# Patient Record
Sex: MALE | Race: WHITE | NOT HISPANIC OR LATINO | Employment: FULL TIME | URBAN - METROPOLITAN AREA
[De-identification: names, ages, dates, MRNs, and addresses within clinical notes are randomized per-mention and may not be internally consistent; named-entity substitution may affect disease eponyms.]

---

## 2021-04-08 DIAGNOSIS — Z23 ENCOUNTER FOR IMMUNIZATION: ICD-10-CM

## 2023-01-01 ENCOUNTER — ANESTHESIA EVENT (OUTPATIENT)
Dept: GASTROENTEROLOGY | Facility: HOSPITAL | Age: 63
End: 2023-01-01

## 2023-01-01 ENCOUNTER — ANESTHESIA (OUTPATIENT)
Dept: GASTROENTEROLOGY | Facility: HOSPITAL | Age: 63
End: 2023-01-01

## 2023-01-01 ENCOUNTER — APPOINTMENT (INPATIENT)
Dept: RADIOLOGY | Facility: HOSPITAL | Age: 63
DRG: 871 | End: 2023-01-01
Payer: COMMERCIAL

## 2023-01-01 ENCOUNTER — APPOINTMENT (EMERGENCY)
Dept: CT IMAGING | Facility: HOSPITAL | Age: 63
DRG: 871 | End: 2023-01-01
Payer: COMMERCIAL

## 2023-01-01 ENCOUNTER — APPOINTMENT (EMERGENCY)
Dept: RADIOLOGY | Facility: HOSPITAL | Age: 63
DRG: 871 | End: 2023-01-01
Payer: COMMERCIAL

## 2023-01-01 ENCOUNTER — HOSPITAL ENCOUNTER (INPATIENT)
Facility: HOSPITAL | Age: 63
LOS: 6 days | DRG: 871 | End: 2023-10-24
Attending: EMERGENCY MEDICINE | Admitting: GENERAL PRACTICE
Payer: COMMERCIAL

## 2023-01-01 ENCOUNTER — LAB REQUISITION (OUTPATIENT)
Dept: LAB | Facility: HOSPITAL | Age: 63
End: 2023-01-01
Payer: COMMERCIAL

## 2023-01-01 VITALS
TEMPERATURE: 97.4 F | DIASTOLIC BLOOD PRESSURE: 64 MMHG | OXYGEN SATURATION: 93 % | HEIGHT: 72 IN | HEART RATE: 99 BPM | BODY MASS INDEX: 24.82 KG/M2 | SYSTOLIC BLOOD PRESSURE: 113 MMHG | RESPIRATION RATE: 18 BRPM

## 2023-01-01 DIAGNOSIS — D72.829 LEUKOCYTOSIS: ICD-10-CM

## 2023-01-01 DIAGNOSIS — Z79.899 OTHER LONG TERM (CURRENT) DRUG THERAPY: ICD-10-CM

## 2023-01-01 DIAGNOSIS — K63.5 POLYP OF COLON: ICD-10-CM

## 2023-01-01 DIAGNOSIS — E87.1 HYPONATREMIA: ICD-10-CM

## 2023-01-01 DIAGNOSIS — C78.7 SECONDARY MALIGNANT NEOPLASM OF LIVER AND INTRAHEPATIC BILE DUCT (HCC): ICD-10-CM

## 2023-01-01 DIAGNOSIS — G89.3 NEOPLASM RELATED PAIN (ACUTE) (CHRONIC): ICD-10-CM

## 2023-01-01 DIAGNOSIS — E83.52 HYPERCALCEMIA: ICD-10-CM

## 2023-01-01 DIAGNOSIS — E86.0 DEHYDRATION: ICD-10-CM

## 2023-01-01 DIAGNOSIS — N17.9 AKI (ACUTE KIDNEY INJURY) (HCC): ICD-10-CM

## 2023-01-01 DIAGNOSIS — R17 UNSPECIFIED JAUNDICE: ICD-10-CM

## 2023-01-01 DIAGNOSIS — R97.8 OTHER ABNORMAL TUMOR MARKERS: ICD-10-CM

## 2023-01-01 DIAGNOSIS — C25.9 PANCREATIC CANCER (HCC): ICD-10-CM

## 2023-01-01 DIAGNOSIS — E80.6 DIRECT HYPERBILIRUBINEMIA: ICD-10-CM

## 2023-01-01 DIAGNOSIS — K85.90 ACUTE PANCREATITIS, UNSPECIFIED COMPLICATION STATUS, UNSPECIFIED PANCREATITIS TYPE: Primary | ICD-10-CM

## 2023-01-01 LAB
2HR DELTA HS TROPONIN: -1 NG/L
4HR DELTA HS TROPONIN: -2 NG/L
ALBUMIN SERPL BCP-MCNC: 2.4 G/DL (ref 3.5–5)
ALBUMIN SERPL BCP-MCNC: 2.5 G/DL (ref 3.5–5)
ALBUMIN SERPL BCP-MCNC: 2.8 G/DL (ref 3.5–5)
ALP SERPL-CCNC: 772 U/L (ref 34–104)
ALP SERPL-CCNC: 806 U/L (ref 34–104)
ALP SERPL-CCNC: 907 U/L (ref 34–104)
ALT SERPL W P-5'-P-CCNC: 116 U/L (ref 7–52)
ALT SERPL W P-5'-P-CCNC: 90 U/L (ref 7–52)
ALT SERPL W P-5'-P-CCNC: 97 U/L (ref 7–52)
AMORPH URATE CRY URNS QL MICRO: NORMAL
ANION GAP SERPL CALCULATED.3IONS-SCNC: 10 MMOL/L
ANION GAP SERPL CALCULATED.3IONS-SCNC: 12 MMOL/L
ANION GAP SERPL CALCULATED.3IONS-SCNC: 12 MMOL/L
ANISOCYTOSIS BLD QL SMEAR: PRESENT
APTT PPP: 30 SECONDS (ref 23–37)
AST SERPL W P-5'-P-CCNC: 161 U/L (ref 13–39)
AST SERPL W P-5'-P-CCNC: 167 U/L (ref 13–39)
AST SERPL W P-5'-P-CCNC: 268 U/L (ref 13–39)
ATRIAL RATE: 84 BPM
BACTERIA BLD CULT: NORMAL
BACTERIA BLD CULT: NORMAL
BACTERIA UR QL AUTO: NORMAL /HPF
BASOPHILS # BLD AUTO: 0.09 THOUSANDS/ÂΜL (ref 0–0.1)
BASOPHILS # BLD MANUAL: 0 THOUSAND/UL (ref 0–0.1)
BASOPHILS NFR BLD AUTO: 1 % (ref 0–1)
BASOPHILS NFR MAR MANUAL: 0 % (ref 0–1)
BILIRUB DIRECT SERPL-MCNC: 22.15 MG/DL (ref 0–0.2)
BILIRUB SERPL-MCNC: 31.51 MG/DL (ref 0.2–1)
BILIRUB SERPL-MCNC: 32.05 MG/DL (ref 0.2–1)
BILIRUB SERPL-MCNC: 35.56 MG/DL (ref 0.2–1)
BILIRUB UR QL STRIP: ABNORMAL
BUN SERPL-MCNC: 35 MG/DL (ref 5–25)
BUN SERPL-MCNC: 42 MG/DL (ref 5–25)
BUN SERPL-MCNC: 58 MG/DL (ref 5–25)
CALCIUM ALBUM COR SERPL-MCNC: 10.7 MG/DL (ref 8.3–10.1)
CALCIUM ALBUM COR SERPL-MCNC: 12 MG/DL (ref 8.3–10.1)
CALCIUM ALBUM COR SERPL-MCNC: 9.2 MG/DL (ref 8.3–10.1)
CALCIUM SERPL-MCNC: 11 MG/DL (ref 8.4–10.2)
CALCIUM SERPL-MCNC: 7.9 MG/DL (ref 8.4–10.2)
CALCIUM SERPL-MCNC: 9.5 MG/DL (ref 8.4–10.2)
CARDIAC TROPONIN I PNL SERPL HS: 18 NG/L
CARDIAC TROPONIN I PNL SERPL HS: 19 NG/L
CARDIAC TROPONIN I PNL SERPL HS: 20 NG/L
CHLORIDE SERPL-SCNC: 94 MMOL/L (ref 96–108)
CHLORIDE SERPL-SCNC: 96 MMOL/L (ref 96–108)
CHLORIDE SERPL-SCNC: 97 MMOL/L (ref 96–108)
CLARITY UR: ABNORMAL
CO2 SERPL-SCNC: 21 MMOL/L (ref 21–32)
CO2 SERPL-SCNC: 24 MMOL/L (ref 21–32)
CO2 SERPL-SCNC: 25 MMOL/L (ref 21–32)
COLOR UR: ABNORMAL
CREAT SERPL-MCNC: 1.63 MG/DL (ref 0.6–1.3)
CREAT SERPL-MCNC: 1.8 MG/DL (ref 0.6–1.3)
CREAT SERPL-MCNC: 2.62 MG/DL (ref 0.6–1.3)
EOSINOPHIL # BLD AUTO: 0.01 THOUSAND/ÂΜL (ref 0–0.61)
EOSINOPHIL # BLD MANUAL: 0.22 THOUSAND/UL (ref 0–0.4)
EOSINOPHIL NFR BLD AUTO: 0 % (ref 0–6)
EOSINOPHIL NFR BLD MANUAL: 1 % (ref 0–6)
ERYTHROCYTE [DISTWIDTH] IN BLOOD BY AUTOMATED COUNT: 24.3 % (ref 11.6–15.1)
ERYTHROCYTE [DISTWIDTH] IN BLOOD BY AUTOMATED COUNT: 24.7 % (ref 11.6–15.1)
ERYTHROCYTE [DISTWIDTH] IN BLOOD BY AUTOMATED COUNT: 25.2 % (ref 11.6–15.1)
FLUAV RNA RESP QL NAA+PROBE: NEGATIVE
FLUBV RNA RESP QL NAA+PROBE: NEGATIVE
GFR SERPL CREATININE-BSD FRML MDRD: 24 ML/MIN/1.73SQ M
GFR SERPL CREATININE-BSD FRML MDRD: 38 ML/MIN/1.73SQ M
GFR SERPL CREATININE-BSD FRML MDRD: 43 ML/MIN/1.73SQ M
GLUCOSE SERPL-MCNC: 81 MG/DL (ref 65–140)
GLUCOSE SERPL-MCNC: 83 MG/DL (ref 65–140)
GLUCOSE SERPL-MCNC: 90 MG/DL (ref 65–140)
GLUCOSE UR STRIP-MCNC: NEGATIVE MG/DL
HCT VFR BLD AUTO: 32.1 % (ref 36.5–49.3)
HCT VFR BLD AUTO: 32.6 % (ref 36.5–49.3)
HCT VFR BLD AUTO: 34.4 % (ref 36.5–49.3)
HGB BLD-MCNC: 10.6 G/DL (ref 12–17)
HGB BLD-MCNC: 11 G/DL (ref 12–17)
HGB BLD-MCNC: 11.5 G/DL (ref 12–17)
HGB UR QL STRIP.AUTO: ABNORMAL
IMM GRANULOCYTES # BLD AUTO: 0.43 THOUSAND/UL (ref 0–0.2)
IMM GRANULOCYTES NFR BLD AUTO: 2 % (ref 0–2)
INR PPP: 1.27 (ref 0.84–1.19)
INR PPP: 1.33 (ref 0.84–1.19)
KETONES UR STRIP-MCNC: NEGATIVE MG/DL
LACTATE SERPL-SCNC: 1.7 MMOL/L (ref 0.5–2)
LACTATE SERPL-SCNC: 2.3 MMOL/L (ref 0.5–2)
LEUKOCYTE ESTERASE UR QL STRIP: NEGATIVE
LIPASE SERPL-CCNC: 1016 U/L (ref 11–82)
LYMPHOCYTES # BLD AUTO: 0 % (ref 14–44)
LYMPHOCYTES # BLD AUTO: 0 THOUSAND/UL (ref 0.6–4.47)
LYMPHOCYTES # BLD AUTO: 0.46 THOUSANDS/ÂΜL (ref 0.6–4.47)
LYMPHOCYTES NFR BLD AUTO: 2 % (ref 14–44)
MAGNESIUM SERPL-MCNC: 2.5 MG/DL (ref 1.9–2.7)
MCH RBC QN AUTO: 27 PG (ref 26.8–34.3)
MCH RBC QN AUTO: 27.4 PG (ref 26.8–34.3)
MCH RBC QN AUTO: 27.5 PG (ref 26.8–34.3)
MCHC RBC AUTO-ENTMCNC: 33 G/DL (ref 31.4–37.4)
MCHC RBC AUTO-ENTMCNC: 33.4 G/DL (ref 31.4–37.4)
MCHC RBC AUTO-ENTMCNC: 33.7 G/DL (ref 31.4–37.4)
MCV RBC AUTO: 82 FL (ref 82–98)
MONOCYTES # BLD AUTO: 0.44 THOUSAND/UL (ref 0–1.22)
MONOCYTES # BLD AUTO: 1.1 THOUSAND/ÂΜL (ref 0.17–1.22)
MONOCYTES NFR BLD AUTO: 6 % (ref 4–12)
MONOCYTES NFR BLD: 2 % (ref 4–12)
NEUTROPHILS # BLD AUTO: 17.54 THOUSANDS/ÂΜL (ref 1.85–7.62)
NEUTROPHILS # BLD MANUAL: 21.42 THOUSAND/UL (ref 1.85–7.62)
NEUTS BAND NFR BLD MANUAL: 1 % (ref 0–8)
NEUTS SEG NFR BLD AUTO: 89 % (ref 43–75)
NEUTS SEG NFR BLD AUTO: 96 % (ref 43–75)
NITRITE UR QL STRIP: NEGATIVE
NON-SQ EPI CELLS URNS QL MICRO: NORMAL /HPF
NRBC BLD AUTO-RTO: 0 /100 WBCS
P AXIS: 36 DEGREES
PH UR STRIP.AUTO: 5.5 [PH]
PHOSPHATE SERPL-MCNC: 4.3 MG/DL (ref 2.3–4.1)
PLATELET # BLD AUTO: 183 THOUSANDS/UL (ref 149–390)
PLATELET # BLD AUTO: 194 THOUSANDS/UL (ref 149–390)
PLATELET # BLD AUTO: 258 THOUSANDS/UL (ref 149–390)
PLATELET BLD QL SMEAR: ADEQUATE
PMV BLD AUTO: 10.1 FL (ref 8.9–12.7)
PMV BLD AUTO: 10.2 FL (ref 8.9–12.7)
PMV BLD AUTO: 10.9 FL (ref 8.9–12.7)
POTASSIUM SERPL-SCNC: 3.9 MMOL/L (ref 3.5–5.3)
POTASSIUM SERPL-SCNC: 4 MMOL/L (ref 3.5–5.3)
POTASSIUM SERPL-SCNC: 4 MMOL/L (ref 3.5–5.3)
PR INTERVAL: 170 MS
PROCALCITONIN SERPL-MCNC: 2.76 NG/ML
PROCALCITONIN SERPL-MCNC: 4.56 NG/ML
PROT SERPL-MCNC: 5.3 G/DL (ref 6.4–8.4)
PROT SERPL-MCNC: 5.4 G/DL (ref 6.4–8.4)
PROT SERPL-MCNC: 6 G/DL (ref 6.4–8.4)
PROT UR STRIP-MCNC: NEGATIVE MG/DL
PROTHROMBIN TIME: 16.3 SECONDS (ref 11.6–14.5)
PROTHROMBIN TIME: 16.6 SECONDS (ref 11.6–14.5)
QRS AXIS: -19 DEGREES
QRSD INTERVAL: 110 MS
QT INTERVAL: 380 MS
QTC INTERVAL: 449 MS
RBC # BLD AUTO: 3.92 MILLION/UL (ref 3.88–5.62)
RBC # BLD AUTO: 4 MILLION/UL (ref 3.88–5.62)
RBC # BLD AUTO: 4.2 MILLION/UL (ref 3.88–5.62)
RBC #/AREA URNS AUTO: NORMAL /HPF
RBC MORPH BLD: PRESENT
RSV RNA RESP QL NAA+PROBE: NEGATIVE
SARS-COV-2 RNA RESP QL NAA+PROBE: NEGATIVE
SODIUM SERPL-SCNC: 130 MMOL/L (ref 135–147)
SODIUM SERPL-SCNC: 130 MMOL/L (ref 135–147)
SODIUM SERPL-SCNC: 131 MMOL/L (ref 135–147)
SP GR UR STRIP.AUTO: 1.01 (ref 1–1.03)
T WAVE AXIS: 39 DEGREES
TARGETS BLD QL SMEAR: PRESENT
UROBILINOGEN UR STRIP-ACNC: <2 MG/DL
VENTRICULAR RATE: 84 BPM
WBC # BLD AUTO: 18.11 THOUSAND/UL (ref 4.31–10.16)
WBC # BLD AUTO: 19.63 THOUSAND/UL (ref 4.31–10.16)
WBC # BLD AUTO: 22.08 THOUSAND/UL (ref 4.31–10.16)
WBC #/AREA URNS AUTO: NORMAL /HPF

## 2023-01-01 PROCEDURE — 96375 TX/PRO/DX INJ NEW DRUG ADDON: CPT

## 2023-01-01 PROCEDURE — 83735 ASSAY OF MAGNESIUM: CPT | Performed by: GENERAL PRACTICE

## 2023-01-01 PROCEDURE — 99285 EMERGENCY DEPT VISIT HI MDM: CPT

## 2023-01-01 PROCEDURE — 97163 PT EVAL HIGH COMPLEX 45 MIN: CPT

## 2023-01-01 PROCEDURE — 99446 NTRPROF PH1/NTRNET/EHR 5-10: CPT | Performed by: RADIOLOGY

## 2023-01-01 PROCEDURE — 87040 BLOOD CULTURE FOR BACTERIA: CPT | Performed by: EMERGENCY MEDICINE

## 2023-01-01 PROCEDURE — 96365 THER/PROPH/DIAG IV INF INIT: CPT

## 2023-01-01 PROCEDURE — G1004 CDSM NDSC: HCPCS

## 2023-01-01 PROCEDURE — 93005 ELECTROCARDIOGRAM TRACING: CPT

## 2023-01-01 PROCEDURE — 80053 COMPREHEN METABOLIC PANEL: CPT | Performed by: EMERGENCY MEDICINE

## 2023-01-01 PROCEDURE — 85610 PROTHROMBIN TIME: CPT | Performed by: INTERNAL MEDICINE

## 2023-01-01 PROCEDURE — 84100 ASSAY OF PHOSPHORUS: CPT | Performed by: GENERAL PRACTICE

## 2023-01-01 PROCEDURE — 83605 ASSAY OF LACTIC ACID: CPT | Performed by: EMERGENCY MEDICINE

## 2023-01-01 PROCEDURE — 80053 COMPREHEN METABOLIC PANEL: CPT

## 2023-01-01 PROCEDURE — 85730 THROMBOPLASTIN TIME PARTIAL: CPT | Performed by: EMERGENCY MEDICINE

## 2023-01-01 PROCEDURE — 96367 TX/PROPH/DG ADDL SEQ IV INF: CPT

## 2023-01-01 PROCEDURE — 99291 CRITICAL CARE FIRST HOUR: CPT | Performed by: EMERGENCY MEDICINE

## 2023-01-01 PROCEDURE — 85025 COMPLETE CBC W/AUTO DIFF WBC: CPT | Performed by: EMERGENCY MEDICINE

## 2023-01-01 PROCEDURE — 85610 PROTHROMBIN TIME: CPT | Performed by: EMERGENCY MEDICINE

## 2023-01-01 PROCEDURE — 97167 OT EVAL HIGH COMPLEX 60 MIN: CPT

## 2023-01-01 PROCEDURE — 84484 ASSAY OF TROPONIN QUANT: CPT | Performed by: EMERGENCY MEDICINE

## 2023-01-01 PROCEDURE — 81001 URINALYSIS AUTO W/SCOPE: CPT | Performed by: EMERGENCY MEDICINE

## 2023-01-01 PROCEDURE — 93010 ELECTROCARDIOGRAM REPORT: CPT | Performed by: INTERNAL MEDICINE

## 2023-01-01 PROCEDURE — 99233 SBSQ HOSP IP/OBS HIGH 50: CPT | Performed by: GENERAL PRACTICE

## 2023-01-01 PROCEDURE — 83690 ASSAY OF LIPASE: CPT | Performed by: EMERGENCY MEDICINE

## 2023-01-01 PROCEDURE — 82248 BILIRUBIN DIRECT: CPT | Performed by: EMERGENCY MEDICINE

## 2023-01-01 PROCEDURE — 0241U HB NFCT DS VIR RESP RNA 4 TRGT: CPT | Performed by: EMERGENCY MEDICINE

## 2023-01-01 PROCEDURE — 71045 X-RAY EXAM CHEST 1 VIEW: CPT

## 2023-01-01 PROCEDURE — 99223 1ST HOSP IP/OBS HIGH 75: CPT | Performed by: GENERAL PRACTICE

## 2023-01-01 PROCEDURE — 84145 PROCALCITONIN (PCT): CPT | Performed by: GENERAL PRACTICE

## 2023-01-01 PROCEDURE — 99232 SBSQ HOSP IP/OBS MODERATE 35: CPT | Performed by: INTERNAL MEDICINE

## 2023-01-01 PROCEDURE — 36415 COLL VENOUS BLD VENIPUNCTURE: CPT | Performed by: EMERGENCY MEDICINE

## 2023-01-01 PROCEDURE — 74177 CT ABD & PELVIS W/CONTRAST: CPT

## 2023-01-01 PROCEDURE — 85007 BL SMEAR W/DIFF WBC COUNT: CPT

## 2023-01-01 PROCEDURE — 99232 SBSQ HOSP IP/OBS MODERATE 35: CPT | Performed by: GENERAL PRACTICE

## 2023-01-01 PROCEDURE — 74018 RADEX ABDOMEN 1 VIEW: CPT

## 2023-01-01 PROCEDURE — 84145 PROCALCITONIN (PCT): CPT | Performed by: EMERGENCY MEDICINE

## 2023-01-01 PROCEDURE — 85027 COMPLETE CBC AUTOMATED: CPT

## 2023-01-01 PROCEDURE — 99222 1ST HOSP IP/OBS MODERATE 55: CPT | Performed by: INTERNAL MEDICINE

## 2023-01-01 RX ORDER — HYDROMORPHONE HCL/PF 1 MG/ML
0.5 SYRINGE (ML) INJECTION EVERY 4 HOURS PRN
Status: DISCONTINUED | OUTPATIENT
Start: 2023-01-01 | End: 2023-01-01

## 2023-01-01 RX ORDER — ACETAMINOPHEN 325 MG/1
650 TABLET ORAL EVERY 6 HOURS PRN
Status: DISCONTINUED | OUTPATIENT
Start: 2023-01-01 | End: 2023-01-01 | Stop reason: HOSPADM

## 2023-01-01 RX ORDER — SODIUM CHLORIDE 9 MG/ML
3 INJECTION INTRAVENOUS EVERY 12 HOURS SCHEDULED
Status: DISCONTINUED | OUTPATIENT
Start: 2023-01-01 | End: 2023-01-01 | Stop reason: HOSPADM

## 2023-01-01 RX ORDER — PROPOFOL 10 MG/ML
INJECTION, EMULSION INTRAVENOUS AS NEEDED
Status: DISCONTINUED | OUTPATIENT
Start: 2023-01-01 | End: 2023-01-01

## 2023-01-01 RX ORDER — HYDROMORPHONE HCL/PF 1 MG/ML
0.5 SYRINGE (ML) INJECTION
Status: DISCONTINUED | OUTPATIENT
Start: 2023-01-01 | End: 2023-01-01

## 2023-01-01 RX ORDER — HYDROMORPHONE HCL/PF 1 MG/ML
1 SYRINGE (ML) INJECTION EVERY 6 HOURS
Status: DISCONTINUED | OUTPATIENT
Start: 2023-01-01 | End: 2023-01-01 | Stop reason: HOSPADM

## 2023-01-01 RX ORDER — LEVOFLOXACIN 500 MG/1
500 TABLET, FILM COATED ORAL EVERY 24 HOURS
Status: DISCONTINUED | OUTPATIENT
Start: 2023-01-01 | End: 2023-01-01

## 2023-01-01 RX ORDER — SODIUM CHLORIDE, SODIUM LACTATE, POTASSIUM CHLORIDE, CALCIUM CHLORIDE 600; 310; 30; 20 MG/100ML; MG/100ML; MG/100ML; MG/100ML
200 INJECTION, SOLUTION INTRAVENOUS CONTINUOUS
Status: DISCONTINUED | OUTPATIENT
Start: 2023-01-01 | End: 2023-01-01

## 2023-01-01 RX ORDER — BISACODYL 10 MG
10 SUPPOSITORY, RECTAL RECTAL DAILY PRN
Status: DISCONTINUED | OUTPATIENT
Start: 2023-01-01 | End: 2023-01-01 | Stop reason: HOSPADM

## 2023-01-01 RX ORDER — SODIUM CHLORIDE, SODIUM LACTATE, POTASSIUM CHLORIDE, CALCIUM CHLORIDE 600; 310; 30; 20 MG/100ML; MG/100ML; MG/100ML; MG/100ML
INJECTION, SOLUTION INTRAVENOUS CONTINUOUS PRN
Status: DISCONTINUED | OUTPATIENT
Start: 2023-01-01 | End: 2023-01-01

## 2023-01-01 RX ORDER — SODIUM CHLORIDE 9 MG/ML
125 INJECTION, SOLUTION INTRAVENOUS CONTINUOUS
Status: DISCONTINUED | OUTPATIENT
Start: 2023-01-01 | End: 2023-01-01

## 2023-01-01 RX ORDER — HYDROMORPHONE HCL/PF 1 MG/ML
1 SYRINGE (ML) INJECTION
Status: DISCONTINUED | OUTPATIENT
Start: 2023-01-01 | End: 2023-01-01 | Stop reason: HOSPADM

## 2023-01-01 RX ORDER — METRONIDAZOLE 500 MG/100ML
500 INJECTION, SOLUTION INTRAVENOUS EVERY 8 HOURS
Status: DISCONTINUED | OUTPATIENT
Start: 2023-01-01 | End: 2023-01-01

## 2023-01-01 RX ORDER — FAMOTIDINE 20 MG/1
20 TABLET, FILM COATED ORAL DAILY
Status: DISCONTINUED | OUTPATIENT
Start: 2023-01-01 | End: 2023-01-01 | Stop reason: HOSPADM

## 2023-01-01 RX ORDER — HYDROMORPHONE HCL/PF 1 MG/ML
0.5 SYRINGE (ML) INJECTION
Status: DISCONTINUED | OUTPATIENT
Start: 2023-01-01 | End: 2023-01-01 | Stop reason: HOSPADM

## 2023-01-01 RX ORDER — FAMOTIDINE 20 MG/1
20 TABLET, FILM COATED ORAL 3 TIMES DAILY
Status: DISCONTINUED | OUTPATIENT
Start: 2023-01-01 | End: 2023-01-01 | Stop reason: DRUGHIGH

## 2023-01-01 RX ORDER — ESMOLOL HYDROCHLORIDE 10 MG/ML
INJECTION INTRAVENOUS AS NEEDED
Status: DISCONTINUED | OUTPATIENT
Start: 2023-01-01 | End: 2023-01-01

## 2023-01-01 RX ORDER — FENTANYL CITRATE 50 UG/ML
INJECTION, SOLUTION INTRAMUSCULAR; INTRAVENOUS AS NEEDED
Status: DISCONTINUED | OUTPATIENT
Start: 2023-01-01 | End: 2023-01-01

## 2023-01-01 RX ORDER — LORAZEPAM 2 MG/ML
0.5 INJECTION INTRAMUSCULAR EVERY 4 HOURS PRN
Status: DISCONTINUED | OUTPATIENT
Start: 2023-01-01 | End: 2023-01-01 | Stop reason: HOSPADM

## 2023-01-01 RX ORDER — ROCURONIUM BROMIDE 10 MG/ML
INJECTION, SOLUTION INTRAVENOUS AS NEEDED
Status: DISCONTINUED | OUTPATIENT
Start: 2023-01-01 | End: 2023-01-01

## 2023-01-01 RX ORDER — HALOPERIDOL 5 MG/ML
0.5 INJECTION INTRAMUSCULAR EVERY 2 HOUR PRN
Status: DISCONTINUED | OUTPATIENT
Start: 2023-01-01 | End: 2023-01-01 | Stop reason: HOSPADM

## 2023-01-01 RX ORDER — OXYCODONE HYDROCHLORIDE 5 MG/1
5 TABLET ORAL EVERY 6 HOURS PRN
Status: DISCONTINUED | OUTPATIENT
Start: 2023-01-01 | End: 2023-01-01 | Stop reason: HOSPADM

## 2023-01-01 RX ORDER — HEPARIN SODIUM 5000 [USP'U]/ML
5000 INJECTION, SOLUTION INTRAVENOUS; SUBCUTANEOUS EVERY 8 HOURS SCHEDULED
Status: DISCONTINUED | OUTPATIENT
Start: 2023-01-01 | End: 2023-01-01

## 2023-01-01 RX ORDER — ONDANSETRON 2 MG/ML
INJECTION INTRAMUSCULAR; INTRAVENOUS AS NEEDED
Status: DISCONTINUED | OUTPATIENT
Start: 2023-01-01 | End: 2023-01-01

## 2023-01-01 RX ORDER — HYDROMORPHONE HCL/PF 1 MG/ML
1 SYRINGE (ML) INJECTION EVERY 4 HOURS PRN
Status: DISCONTINUED | OUTPATIENT
Start: 2023-01-01 | End: 2023-01-01

## 2023-01-01 RX ORDER — ATORVASTATIN CALCIUM 40 MG/1
20 TABLET, FILM COATED ORAL DAILY
Status: DISCONTINUED | OUTPATIENT
Start: 2023-01-01 | End: 2023-01-01

## 2023-01-01 RX ADMIN — HYDROMORPHONE HYDROCHLORIDE 1 MG: 1 INJECTION, SOLUTION INTRAMUSCULAR; INTRAVENOUS; SUBCUTANEOUS at 14:21

## 2023-01-01 RX ADMIN — HALOPERIDOL LACTATE 0.5 MG: 5 INJECTION, SOLUTION INTRAMUSCULAR at 22:24

## 2023-01-01 RX ADMIN — SUGAMMADEX 200 MG: 100 INJECTION, SOLUTION INTRAVENOUS at 17:05

## 2023-01-01 RX ADMIN — SODIUM CHLORIDE 125 ML/HR: 0.9 INJECTION, SOLUTION INTRAVENOUS at 14:25

## 2023-01-01 RX ADMIN — HYDROMORPHONE HYDROCHLORIDE 0.5 MG: 1 INJECTION, SOLUTION INTRAMUSCULAR; INTRAVENOUS; SUBCUTANEOUS at 10:00

## 2023-01-01 RX ADMIN — LORAZEPAM 0.5 MG: 2 INJECTION INTRAMUSCULAR; INTRAVENOUS at 16:20

## 2023-01-01 RX ADMIN — CEFEPIME 2000 MG: 2 INJECTION, POWDER, FOR SOLUTION INTRAVENOUS at 08:07

## 2023-01-01 RX ADMIN — HYDROMORPHONE HYDROCHLORIDE 1 MG: 1 INJECTION, SOLUTION INTRAMUSCULAR; INTRAVENOUS; SUBCUTANEOUS at 06:00

## 2023-01-01 RX ADMIN — ESMOLOL HYDROCHLORIDE 40 MG: 100 INJECTION, SOLUTION INTRAVENOUS at 16:25

## 2023-01-01 RX ADMIN — CEFEPIME 2000 MG: 2 INJECTION, POWDER, FOR SOLUTION INTRAVENOUS at 22:15

## 2023-01-01 RX ADMIN — ONDANSETRON 4 MG: 2 INJECTION INTRAMUSCULAR; INTRAVENOUS at 15:50

## 2023-01-01 RX ADMIN — ROCURONIUM BROMIDE 50 MG: 10 INJECTION, SOLUTION INTRAVENOUS at 15:44

## 2023-01-01 RX ADMIN — HYDROMORPHONE HYDROCHLORIDE 1 MG: 1 INJECTION, SOLUTION INTRAMUSCULAR; INTRAVENOUS; SUBCUTANEOUS at 12:15

## 2023-01-01 RX ADMIN — FAMOTIDINE 20 MG: 20 TABLET ORAL at 08:07

## 2023-01-01 RX ADMIN — SODIUM CHLORIDE, PRESERVATIVE FREE 3 ML: 5 INJECTION INTRAVENOUS at 22:46

## 2023-01-01 RX ADMIN — CEFEPIME 2000 MG: 2 INJECTION, POWDER, FOR SOLUTION INTRAVENOUS at 09:27

## 2023-01-01 RX ADMIN — HYDROMORPHONE HYDROCHLORIDE 1 MG: 1 INJECTION, SOLUTION INTRAMUSCULAR; INTRAVENOUS; SUBCUTANEOUS at 22:46

## 2023-01-01 RX ADMIN — SODIUM CHLORIDE, SODIUM LACTATE, POTASSIUM CHLORIDE, AND CALCIUM CHLORIDE 200 ML/HR: .6; .31; .03; .02 INJECTION, SOLUTION INTRAVENOUS at 03:43

## 2023-01-01 RX ADMIN — LORAZEPAM 0.5 MG: 2 INJECTION INTRAMUSCULAR; INTRAVENOUS at 20:11

## 2023-01-01 RX ADMIN — SODIUM CHLORIDE, PRESERVATIVE FREE 3 ML: 5 INJECTION INTRAVENOUS at 09:29

## 2023-01-01 RX ADMIN — OXYCODONE HYDROCHLORIDE 5 MG: 5 TABLET ORAL at 22:03

## 2023-01-01 RX ADMIN — HYDROMORPHONE HYDROCHLORIDE 0.5 MG: 1 INJECTION, SOLUTION INTRAMUSCULAR; INTRAVENOUS; SUBCUTANEOUS at 03:39

## 2023-01-01 RX ADMIN — SODIUM CHLORIDE, SODIUM LACTATE, POTASSIUM CHLORIDE, AND CALCIUM CHLORIDE: .6; .31; .03; .02 INJECTION, SOLUTION INTRAVENOUS at 15:35

## 2023-01-01 RX ADMIN — HYDROMORPHONE HYDROCHLORIDE 0.5 MG: 1 INJECTION, SOLUTION INTRAMUSCULAR; INTRAVENOUS; SUBCUTANEOUS at 07:26

## 2023-01-01 RX ADMIN — METRONIDAZOLE 500 MG: 500 INJECTION, SOLUTION INTRAVENOUS at 15:08

## 2023-01-01 RX ADMIN — CEFEPIME 2000 MG: 2 INJECTION, POWDER, FOR SOLUTION INTRAVENOUS at 09:26

## 2023-01-01 RX ADMIN — LORAZEPAM 0.5 MG: 2 INJECTION INTRAMUSCULAR; INTRAVENOUS at 22:52

## 2023-01-01 RX ADMIN — FAMOTIDINE 20 MG: 20 TABLET ORAL at 09:27

## 2023-01-01 RX ADMIN — HALOPERIDOL LACTATE 0.5 MG: 5 INJECTION, SOLUTION INTRAMUSCULAR at 18:27

## 2023-01-01 RX ADMIN — HYDROMORPHONE HYDROCHLORIDE 1 MG: 1 INJECTION, SOLUTION INTRAMUSCULAR; INTRAVENOUS; SUBCUTANEOUS at 00:28

## 2023-01-01 RX ADMIN — HYDROMORPHONE HYDROCHLORIDE 1 MG: 1 INJECTION, SOLUTION INTRAMUSCULAR; INTRAVENOUS; SUBCUTANEOUS at 05:54

## 2023-01-01 RX ADMIN — SODIUM CHLORIDE, SODIUM LACTATE, POTASSIUM CHLORIDE, AND CALCIUM CHLORIDE 1000 ML: .6; .31; .03; .02 INJECTION, SOLUTION INTRAVENOUS at 19:30

## 2023-01-01 RX ADMIN — METRONIDAZOLE 500 MG: 500 INJECTION, SOLUTION INTRAVENOUS at 06:11

## 2023-01-01 RX ADMIN — HYDROMORPHONE HYDROCHLORIDE 0.5 MG: 1 INJECTION, SOLUTION INTRAMUSCULAR; INTRAVENOUS; SUBCUTANEOUS at 20:22

## 2023-01-01 RX ADMIN — SODIUM CHLORIDE 125 ML/HR: 0.9 INJECTION, SOLUTION INTRAVENOUS at 00:15

## 2023-01-01 RX ADMIN — METRONIDAZOLE 500 MG: 500 INJECTION, SOLUTION INTRAVENOUS at 22:29

## 2023-01-01 RX ADMIN — METRONIDAZOLE 500 MG: 500 INJECTION, SOLUTION INTRAVENOUS at 06:05

## 2023-01-01 RX ADMIN — METRONIDAZOLE 500 MG: 500 INJECTION, SOLUTION INTRAVENOUS at 06:01

## 2023-01-01 RX ADMIN — SODIUM CHLORIDE, PRESERVATIVE FREE 3 ML: 5 INJECTION INTRAVENOUS at 21:22

## 2023-01-01 RX ADMIN — PROPOFOL 200 MG: 10 INJECTION, EMULSION INTRAVENOUS at 15:44

## 2023-01-01 RX ADMIN — HYDROMORPHONE HYDROCHLORIDE 1 MG: 1 INJECTION, SOLUTION INTRAMUSCULAR; INTRAVENOUS; SUBCUTANEOUS at 17:10

## 2023-01-01 RX ADMIN — METRONIDAZOLE 500 MG: 500 INJECTION, SOLUTION INTRAVENOUS at 15:26

## 2023-01-01 RX ADMIN — HEPARIN SODIUM 5000 UNITS: 5000 INJECTION INTRAVENOUS; SUBCUTANEOUS at 05:38

## 2023-01-01 RX ADMIN — OXYCODONE HYDROCHLORIDE 5 MG: 5 TABLET ORAL at 23:45

## 2023-01-01 RX ADMIN — METRONIDAZOLE 500 MG: 500 INJECTION, SOLUTION INTRAVENOUS at 22:52

## 2023-01-01 RX ADMIN — OXYCODONE HYDROCHLORIDE 5 MG: 5 TABLET ORAL at 11:44

## 2023-01-01 RX ADMIN — SODIUM CHLORIDE, SODIUM LACTATE, POTASSIUM CHLORIDE, AND CALCIUM CHLORIDE 200 ML/HR: .6; .31; .03; .02 INJECTION, SOLUTION INTRAVENOUS at 15:20

## 2023-01-01 RX ADMIN — PHENYLEPHRINE HYDROCHLORIDE 20 MCG/MIN: 10 INJECTION INTRAVENOUS at 15:50

## 2023-01-01 RX ADMIN — SODIUM CHLORIDE, PRESERVATIVE FREE 3 ML: 5 INJECTION INTRAVENOUS at 21:39

## 2023-01-01 RX ADMIN — LORAZEPAM 0.5 MG: 2 INJECTION INTRAMUSCULAR; INTRAVENOUS at 06:12

## 2023-01-01 RX ADMIN — HYDROMORPHONE HYDROCHLORIDE 1 MG: 1 INJECTION, SOLUTION INTRAMUSCULAR; INTRAVENOUS; SUBCUTANEOUS at 23:19

## 2023-01-01 RX ADMIN — CEFEPIME 2000 MG: 2 INJECTION, POWDER, FOR SOLUTION INTRAVENOUS at 07:40

## 2023-01-01 RX ADMIN — LORAZEPAM 0.5 MG: 2 INJECTION INTRAMUSCULAR; INTRAVENOUS at 02:19

## 2023-01-01 RX ADMIN — HYDROMORPHONE HYDROCHLORIDE 0.5 MG: 1 INJECTION, SOLUTION INTRAMUSCULAR; INTRAVENOUS; SUBCUTANEOUS at 02:01

## 2023-01-01 RX ADMIN — HYDROMORPHONE HYDROCHLORIDE 1 MG: 1 INJECTION, SOLUTION INTRAMUSCULAR; INTRAVENOUS; SUBCUTANEOUS at 04:35

## 2023-01-01 RX ADMIN — METRONIDAZOLE 500 MG: 500 INJECTION, SOLUTION INTRAVENOUS at 22:02

## 2023-01-01 RX ADMIN — HYDROMORPHONE HYDROCHLORIDE 1 MG: 1 INJECTION, SOLUTION INTRAMUSCULAR; INTRAVENOUS; SUBCUTANEOUS at 06:11

## 2023-01-01 RX ADMIN — IOHEXOL 100 ML: 350 INJECTION, SOLUTION INTRAVENOUS at 21:12

## 2023-01-01 RX ADMIN — HYDROMORPHONE HYDROCHLORIDE 1 MG: 1 INJECTION, SOLUTION INTRAMUSCULAR; INTRAVENOUS; SUBCUTANEOUS at 12:03

## 2023-01-01 RX ADMIN — HYDROMORPHONE HYDROCHLORIDE 0.5 MG: 1 INJECTION, SOLUTION INTRAMUSCULAR; INTRAVENOUS; SUBCUTANEOUS at 23:02

## 2023-01-01 RX ADMIN — SODIUM CHLORIDE, PRESERVATIVE FREE 3 ML: 5 INJECTION INTRAVENOUS at 00:00

## 2023-01-01 RX ADMIN — HYDROMORPHONE HYDROCHLORIDE 0.5 MG: 1 INJECTION, SOLUTION INTRAMUSCULAR; INTRAVENOUS; SUBCUTANEOUS at 18:16

## 2023-01-01 RX ADMIN — METRONIDAZOLE 500 MG: 500 INJECTION, SOLUTION INTRAVENOUS at 15:07

## 2023-01-01 RX ADMIN — CEFEPIME 2000 MG: 2 INJECTION, POWDER, FOR SOLUTION INTRAVENOUS at 22:49

## 2023-01-01 RX ADMIN — HEPARIN SODIUM 5000 UNITS: 5000 INJECTION INTRAVENOUS; SUBCUTANEOUS at 22:01

## 2023-01-01 RX ADMIN — HYDROMORPHONE HYDROCHLORIDE 1 MG: 1 INJECTION, SOLUTION INTRAMUSCULAR; INTRAVENOUS; SUBCUTANEOUS at 17:18

## 2023-01-01 RX ADMIN — HEPARIN SODIUM 5000 UNITS: 5000 INJECTION INTRAVENOUS; SUBCUTANEOUS at 15:03

## 2023-01-01 RX ADMIN — HYDROMORPHONE HYDROCHLORIDE 1 MG: 1 INJECTION, SOLUTION INTRAMUSCULAR; INTRAVENOUS; SUBCUTANEOUS at 17:05

## 2023-01-01 RX ADMIN — HYDROMORPHONE HYDROCHLORIDE 0.5 MG: 1 INJECTION, SOLUTION INTRAMUSCULAR; INTRAVENOUS; SUBCUTANEOUS at 02:04

## 2023-01-01 RX ADMIN — CEFEPIME 2000 MG: 2 INJECTION, POWDER, FOR SOLUTION INTRAVENOUS at 20:13

## 2023-01-01 RX ADMIN — HEPARIN SODIUM 5000 UNITS: 5000 INJECTION INTRAVENOUS; SUBCUTANEOUS at 05:19

## 2023-01-01 RX ADMIN — HYDROMORPHONE HYDROCHLORIDE 0.5 MG: 1 INJECTION, SOLUTION INTRAMUSCULAR; INTRAVENOUS; SUBCUTANEOUS at 15:45

## 2023-01-01 RX ADMIN — FENTANYL CITRATE 100 MCG: 50 INJECTION, SOLUTION INTRAMUSCULAR; INTRAVENOUS at 15:44

## 2023-01-01 RX ADMIN — HYDROMORPHONE HYDROCHLORIDE 1 MG: 1 INJECTION, SOLUTION INTRAMUSCULAR; INTRAVENOUS; SUBCUTANEOUS at 00:00

## 2023-01-01 RX ADMIN — HYDROMORPHONE HYDROCHLORIDE 1 MG: 1 INJECTION, SOLUTION INTRAMUSCULAR; INTRAVENOUS; SUBCUTANEOUS at 08:46

## 2023-01-01 RX ADMIN — HYDROMORPHONE HYDROCHLORIDE 0.5 MG: 1 INJECTION, SOLUTION INTRAMUSCULAR; INTRAVENOUS; SUBCUTANEOUS at 21:20

## 2023-01-01 RX ADMIN — SODIUM CHLORIDE 125 ML/HR: 0.9 INJECTION, SOLUTION INTRAVENOUS at 05:36

## 2023-01-01 RX ADMIN — SODIUM CHLORIDE, SODIUM LACTATE, POTASSIUM CHLORIDE, AND CALCIUM CHLORIDE 200 ML/HR: .6; .31; .03; .02 INJECTION, SOLUTION INTRAVENOUS at 10:45

## 2023-01-01 RX ADMIN — SODIUM CHLORIDE, PRESERVATIVE FREE 3 ML: 5 INJECTION INTRAVENOUS at 12:23

## 2023-01-01 RX ADMIN — HYDROMORPHONE HYDROCHLORIDE 0.5 MG: 1 INJECTION, SOLUTION INTRAMUSCULAR; INTRAVENOUS; SUBCUTANEOUS at 13:54

## 2023-01-01 RX ADMIN — SODIUM CHLORIDE, SODIUM LACTATE, POTASSIUM CHLORIDE, AND CALCIUM CHLORIDE 200 ML/HR: .6; .31; .03; .02 INJECTION, SOLUTION INTRAVENOUS at 22:05

## 2023-01-01 RX ADMIN — CEFEPIME 2000 MG: 2 INJECTION, POWDER, FOR SOLUTION INTRAVENOUS at 19:50

## 2023-01-01 RX ADMIN — HALOPERIDOL LACTATE 0.5 MG: 5 INJECTION, SOLUTION INTRAMUSCULAR at 21:52

## 2023-01-01 RX ADMIN — HYDROMORPHONE HYDROCHLORIDE 1 MG: 1 INJECTION, SOLUTION INTRAMUSCULAR; INTRAVENOUS; SUBCUTANEOUS at 12:24

## 2023-01-01 RX ADMIN — OXYCODONE HYDROCHLORIDE 5 MG: 5 TABLET ORAL at 12:20

## 2023-01-01 RX ADMIN — HYDROMORPHONE HYDROCHLORIDE 1 MG: 1 INJECTION, SOLUTION INTRAMUSCULAR; INTRAVENOUS; SUBCUTANEOUS at 09:19

## 2023-01-01 RX ADMIN — LORAZEPAM 0.5 MG: 2 INJECTION INTRAMUSCULAR; INTRAVENOUS at 03:52

## 2023-01-01 RX ADMIN — SODIUM CHLORIDE, PRESERVATIVE FREE 3 ML: 5 INJECTION INTRAVENOUS at 08:08

## 2023-10-07 ENCOUNTER — HOSPITAL ENCOUNTER (OUTPATIENT)
Dept: CT IMAGING | Facility: HOSPITAL | Age: 63
Discharge: HOME | End: 2023-10-07
Payer: COMMERCIAL

## 2023-10-07 DIAGNOSIS — R17 JAUNDICE: ICD-10-CM

## 2023-10-07 DIAGNOSIS — E80.6 HYPERBILIRUBINEMIA: ICD-10-CM

## 2023-10-07 DIAGNOSIS — Z80.0 FAMILY HISTORY OF PANCREATIC CANCER: ICD-10-CM

## 2023-10-07 DIAGNOSIS — R79.89 ELEVATED LFTS: ICD-10-CM

## 2023-10-07 PROCEDURE — 74177 CT ABD & PELVIS W/CONTRAST: CPT

## 2023-10-07 PROCEDURE — G1004 CDSM NDSC: HCPCS

## 2023-10-07 RX ADMIN — IOHEXOL 100 ML: 350 INJECTION, SOLUTION INTRAVENOUS at 10:32

## 2023-10-08 ENCOUNTER — APPOINTMENT (OUTPATIENT)
Dept: LAB | Facility: CLINIC | Age: 63
End: 2023-10-08
Payer: COMMERCIAL

## 2023-10-08 DIAGNOSIS — R10.13 EPIGASTRIC PAIN: ICD-10-CM

## 2023-10-08 DIAGNOSIS — K86.9 PANCREATIC LESION: ICD-10-CM

## 2023-10-08 DIAGNOSIS — Z80.0 FAMILY HISTORY OF PANCREATIC CANCER: ICD-10-CM

## 2023-10-08 DIAGNOSIS — R17 SCLERAL ICTERUS: ICD-10-CM

## 2023-10-08 DIAGNOSIS — K76.9 LIVER LESION: ICD-10-CM

## 2023-10-08 DIAGNOSIS — R05.8 DRY COUGH: ICD-10-CM

## 2023-10-08 DIAGNOSIS — K21.9 GASTROESOPHAGEAL REFLUX DISEASE, UNSPECIFIED WHETHER ESOPHAGITIS PRESENT: ICD-10-CM

## 2023-10-08 DIAGNOSIS — R14.0 BLOATING: ICD-10-CM

## 2023-10-08 LAB
AFP-TM SERPL-MCNC: 2.52 NG/ML (ref 0–9)
ALBUMIN SERPL BCP-MCNC: 3 G/DL (ref 3.5–5)
ALP SERPL-CCNC: 776 U/L (ref 34–104)
ALT SERPL W P-5'-P-CCNC: 113 U/L (ref 7–52)
ANION GAP SERPL CALCULATED.3IONS-SCNC: 9 MMOL/L
AST SERPL W P-5'-P-CCNC: 155 U/L (ref 13–39)
BASOPHILS # BLD AUTO: 0.06 THOUSANDS/ÂΜL (ref 0–0.1)
BASOPHILS NFR BLD AUTO: 1 % (ref 0–1)
BILIRUB SERPL-MCNC: 13.51 MG/DL (ref 0.2–1)
BUN SERPL-MCNC: 10 MG/DL (ref 5–25)
CALCIUM ALBUM COR SERPL-MCNC: 10.4 MG/DL (ref 8.3–10.1)
CALCIUM SERPL-MCNC: 9.6 MG/DL (ref 8.4–10.2)
CEA SERPL-MCNC: 141.7 NG/ML (ref 0–3)
CHLORIDE SERPL-SCNC: 99 MMOL/L (ref 96–108)
CO2 SERPL-SCNC: 25 MMOL/L (ref 21–32)
CREAT SERPL-MCNC: 0.93 MG/DL (ref 0.6–1.3)
EOSINOPHIL # BLD AUTO: 0.08 THOUSAND/ÂΜL (ref 0–0.61)
EOSINOPHIL NFR BLD AUTO: 1 % (ref 0–6)
ERYTHROCYTE [DISTWIDTH] IN BLOOD BY AUTOMATED COUNT: 18.2 % (ref 11.6–15.1)
GFR SERPL CREATININE-BSD FRML MDRD: 86 ML/MIN/1.73SQ M
GLUCOSE P FAST SERPL-MCNC: 100 MG/DL (ref 65–99)
HCT VFR BLD AUTO: 38 % (ref 36.5–49.3)
HGB BLD-MCNC: 12.6 G/DL (ref 12–17)
IMM GRANULOCYTES # BLD AUTO: 0.09 THOUSAND/UL (ref 0–0.2)
IMM GRANULOCYTES NFR BLD AUTO: 1 % (ref 0–2)
LYMPHOCYTES # BLD AUTO: 0.78 THOUSANDS/ÂΜL (ref 0.6–4.47)
LYMPHOCYTES NFR BLD AUTO: 7 % (ref 14–44)
MCH RBC QN AUTO: 26.4 PG (ref 26.8–34.3)
MCHC RBC AUTO-ENTMCNC: 33.2 G/DL (ref 31.4–37.4)
MCV RBC AUTO: 80 FL (ref 82–98)
MONOCYTES # BLD AUTO: 1.06 THOUSAND/ÂΜL (ref 0.17–1.22)
MONOCYTES NFR BLD AUTO: 10 % (ref 4–12)
NEUTROPHILS # BLD AUTO: 8.91 THOUSANDS/ÂΜL (ref 1.85–7.62)
NEUTS SEG NFR BLD AUTO: 80 % (ref 43–75)
NRBC BLD AUTO-RTO: 0 /100 WBCS
PLATELET # BLD AUTO: 288 THOUSANDS/UL (ref 149–390)
PMV BLD AUTO: 10.8 FL (ref 8.9–12.7)
POTASSIUM SERPL-SCNC: 4.1 MMOL/L (ref 3.5–5.3)
PROT SERPL-MCNC: 6.8 G/DL (ref 6.4–8.4)
RBC # BLD AUTO: 4.78 MILLION/UL (ref 3.88–5.62)
SODIUM SERPL-SCNC: 133 MMOL/L (ref 135–147)
WBC # BLD AUTO: 10.98 THOUSAND/UL (ref 4.31–10.16)

## 2023-10-08 PROCEDURE — 82378 CARCINOEMBRYONIC ANTIGEN: CPT

## 2023-10-08 PROCEDURE — 36415 COLL VENOUS BLD VENIPUNCTURE: CPT

## 2023-10-08 PROCEDURE — 85025 COMPLETE CBC W/AUTO DIFF WBC: CPT

## 2023-10-08 PROCEDURE — 82105 ALPHA-FETOPROTEIN SERUM: CPT

## 2023-10-08 PROCEDURE — 86301 IMMUNOASSAY TUMOR CA 19-9: CPT

## 2023-10-08 PROCEDURE — 80053 COMPREHEN METABOLIC PANEL: CPT

## 2023-10-09 DIAGNOSIS — R16.0 LIVER MASSES: Primary | ICD-10-CM

## 2023-10-09 LAB — CANCER AG19-9 SERPL-ACNC: ABNORMAL U/ML (ref 0–35)

## 2023-10-11 ENCOUNTER — TELEPHONE (OUTPATIENT)
Dept: HEMATOLOGY ONCOLOGY | Facility: CLINIC | Age: 63
End: 2023-10-11

## 2023-10-11 ENCOUNTER — PREP FOR PROCEDURE (OUTPATIENT)
Dept: GASTROENTEROLOGY | Facility: CLINIC | Age: 63
End: 2023-10-11

## 2023-10-11 DIAGNOSIS — E80.6 HYPERBILIRUBINEMIA: Primary | ICD-10-CM

## 2023-10-11 NOTE — TELEPHONE ENCOUNTER
I called Sandra Davila in response to a referral that was received for patient to establish care with Medical Oncology. Outreach was made to schedule a consultation. Patient declined scheduling. The referral has been closed.

## 2023-10-11 NOTE — TELEPHONE ENCOUNTER
I called Svetlana Sepulveda in response to a referral that was received for patient to establish care with Surgical Oncology. Outreach was made to schedule a consultation. Patient declined scheduling. The referral has been closed.

## 2023-10-12 ENCOUNTER — HOSPITAL ENCOUNTER (OUTPATIENT)
Dept: MRI IMAGING | Facility: HOSPITAL | Age: 63
Discharge: HOME | End: 2023-10-12
Attending: INTERNAL MEDICINE
Payer: COMMERCIAL

## 2023-10-12 DIAGNOSIS — R60.0 LOWER EXTREMITY EDEMA: ICD-10-CM

## 2023-10-12 DIAGNOSIS — K63.5 POLYP OF COLON, UNSPECIFIED PART OF COLON, UNSPECIFIED TYPE: ICD-10-CM

## 2023-10-12 DIAGNOSIS — C78.7 MALIGNANT NEOPLASM METASTATIC TO LIVER (HCC): ICD-10-CM

## 2023-10-12 PROCEDURE — 74181 MRI ABDOMEN W/O CONTRAST: CPT

## 2023-10-12 PROCEDURE — G1004 CDSM NDSC: HCPCS

## 2023-10-13 ENCOUNTER — TELEPHONE (OUTPATIENT)
Dept: HEMATOLOGY ONCOLOGY | Facility: CLINIC | Age: 63
End: 2023-10-13

## 2023-10-13 ENCOUNTER — ANESTHESIA (OUTPATIENT)
Dept: GASTROENTEROLOGY | Facility: HOSPITAL | Age: 63
End: 2023-10-13

## 2023-10-13 ENCOUNTER — ANESTHESIA EVENT (OUTPATIENT)
Dept: GASTROENTEROLOGY | Facility: HOSPITAL | Age: 63
End: 2023-10-13

## 2023-10-13 ENCOUNTER — HOSPITAL ENCOUNTER (OUTPATIENT)
Dept: GASTROENTEROLOGY | Facility: HOSPITAL | Age: 63
Setting detail: OUTPATIENT SURGERY
End: 2023-10-13
Attending: INTERNAL MEDICINE
Payer: COMMERCIAL

## 2023-10-13 VITALS
WEIGHT: 183 LBS | SYSTOLIC BLOOD PRESSURE: 114 MMHG | HEART RATE: 84 BPM | TEMPERATURE: 98.9 F | OXYGEN SATURATION: 92 % | BODY MASS INDEX: 24.79 KG/M2 | RESPIRATION RATE: 16 BRPM | DIASTOLIC BLOOD PRESSURE: 67 MMHG | HEIGHT: 72 IN

## 2023-10-13 DIAGNOSIS — C22.9 MALIGNANT NEOPLASM OF LIVER AND INTRAHEPATIC BILE DUCTS (HCC): ICD-10-CM

## 2023-10-13 DIAGNOSIS — C22.1 MALIGNANT NEOPLASM OF LIVER AND INTRAHEPATIC BILE DUCTS (HCC): ICD-10-CM

## 2023-10-13 PROCEDURE — C1889 IMPLANT/INSERT DEVICE, NOC: HCPCS

## 2023-10-13 PROCEDURE — 88305 TISSUE EXAM BY PATHOLOGIST: CPT | Performed by: PATHOLOGY

## 2023-10-13 PROCEDURE — 88160 CYTOPATH SMEAR OTHER SOURCE: CPT | Performed by: PATHOLOGY

## 2023-10-13 RX ORDER — LIDOCAINE HYDROCHLORIDE 10 MG/ML
INJECTION, SOLUTION EPIDURAL; INFILTRATION; INTRACAUDAL; PERINEURAL AS NEEDED
Status: DISCONTINUED | OUTPATIENT
Start: 2023-10-13 | End: 2023-10-13

## 2023-10-13 RX ORDER — SODIUM CHLORIDE 9 MG/ML
INJECTION, SOLUTION INTRAVENOUS CONTINUOUS PRN
Status: DISCONTINUED | OUTPATIENT
Start: 2023-10-13 | End: 2023-10-13

## 2023-10-13 RX ORDER — FENTANYL CITRATE 50 UG/ML
INJECTION, SOLUTION INTRAMUSCULAR; INTRAVENOUS AS NEEDED
Status: DISCONTINUED | OUTPATIENT
Start: 2023-10-13 | End: 2023-10-13

## 2023-10-13 RX ORDER — ONDANSETRON 2 MG/ML
INJECTION INTRAMUSCULAR; INTRAVENOUS AS NEEDED
Status: DISCONTINUED | OUTPATIENT
Start: 2023-10-13 | End: 2023-10-13

## 2023-10-13 RX ORDER — PROPOFOL 10 MG/ML
INJECTION, EMULSION INTRAVENOUS AS NEEDED
Status: DISCONTINUED | OUTPATIENT
Start: 2023-10-13 | End: 2023-10-13

## 2023-10-13 RX ORDER — PHYTONADIONE 10 MG/ML
10 INJECTION, EMULSION INTRAMUSCULAR; INTRAVENOUS; SUBCUTANEOUS ONCE
Status: COMPLETED | OUTPATIENT
Start: 2023-10-13 | End: 2023-10-13

## 2023-10-13 RX ORDER — PROPOFOL 10 MG/ML
INJECTION, EMULSION INTRAVENOUS CONTINUOUS PRN
Status: DISCONTINUED | OUTPATIENT
Start: 2023-10-13 | End: 2023-10-13

## 2023-10-13 RX ADMIN — FENTANYL CITRATE 25 MCG: 50 INJECTION INTRAMUSCULAR; INTRAVENOUS at 15:02

## 2023-10-13 RX ADMIN — PROPOFOL 100 MG: 10 INJECTION, EMULSION INTRAVENOUS at 14:32

## 2023-10-13 RX ADMIN — FENTANYL CITRATE 25 MCG: 50 INJECTION INTRAMUSCULAR; INTRAVENOUS at 14:40

## 2023-10-13 RX ADMIN — SODIUM CHLORIDE: 0.9 INJECTION, SOLUTION INTRAVENOUS at 14:29

## 2023-10-13 RX ADMIN — PHYTONADIONE 10 MG: 10 INJECTION, EMULSION INTRAMUSCULAR; INTRAVENOUS; SUBCUTANEOUS at 16:43

## 2023-10-13 RX ADMIN — LIDOCAINE HYDROCHLORIDE 80 MG: 10 INJECTION, SOLUTION EPIDURAL; INFILTRATION; INTRACAUDAL; PERINEURAL at 14:32

## 2023-10-13 RX ADMIN — FENTANYL CITRATE 25 MCG: 50 INJECTION INTRAMUSCULAR; INTRAVENOUS at 14:32

## 2023-10-13 RX ADMIN — PROPOFOL 100 MCG/KG/MIN: 10 INJECTION, EMULSION INTRAVENOUS at 14:32

## 2023-10-13 RX ADMIN — FENTANYL CITRATE 25 MCG: 50 INJECTION INTRAMUSCULAR; INTRAVENOUS at 15:13

## 2023-10-13 RX ADMIN — PROPOFOL 50 MG: 10 INJECTION, EMULSION INTRAVENOUS at 14:35

## 2023-10-13 RX ADMIN — ONDANSETRON 4 MG: 2 INJECTION INTRAMUSCULAR; INTRAVENOUS at 14:32

## 2023-10-13 RX ADMIN — SODIUM CHLORIDE: 0.9 INJECTION, SOLUTION INTRAVENOUS at 15:14

## 2023-10-13 RX ADMIN — PROPOFOL 50 MG: 10 INJECTION, EMULSION INTRAVENOUS at 14:38

## 2023-10-13 NOTE — H&P
History and Physical -  [facility]  Arabella Martinez 58 y.o. male MRN: [id number]                  HPI: Arabella Martinez is a 58y.o. year old male who presents for EGD and EUS. Indications for the procedure: Suspected metastatic pancreatic carcinoma. EUS is performed for tissue diagnosis. REVIEW OF SYSTEMS: Per the HPI, and otherwise unremarkable. Historical Information   Past Medical History:   Diagnosis Date    GERD (gastroesophageal reflux disease)     Hyperlipidemia      Past Surgical History:   Procedure Laterality Date    COLONOSCOPY  2019    Normal per patient, done elsewhere. Social History   Social History     Substance and Sexual Activity   Alcohol Use Not Currently     Social History     Substance and Sexual Activity   Drug Use Never     Social History     Tobacco Use   Smoking Status Never   Smokeless Tobacco Never     Family History   Problem Relation Age of Onset    Pancreatic cancer Father     Colon cancer Neg Hx         no known family GI malignancy       Meds/Allergies       Current Outpatient Medications:     atorvastatin (LIPITOR) 20 mg tablet    famotidine (PEPCID) 20 mg tablet    APPLE CIDER VINEGAR PO    No Known Allergies    Objective     /77   Pulse 101   Temp 97.8 °F (36.6 °C) (Tympanic)   Resp 17   Ht 6' (1.829 m)   Wt 83 kg (183 lb)   SpO2 97%   BMI 24.82 kg/m²       PHYSICAL EXAM    Gen: NAD  Head: NCAT  CV: RRR  CHEST: Clear  ABD: soft, NT/ND  EXT: no edema      ASSESSMENT/PLAN:  This is a 58y.o. year old male here for EGD and EUS, and he is stable and optimized for his procedure.

## 2023-10-13 NOTE — ANESTHESIA PREPROCEDURE EVALUATION
Procedure:  ENDOSCOPIC ULTRASOUND (UPPER)    Relevant Problems   No relevant active problems    Pancreatic mass, potential metastasis gerd, jaundice    Physical Exam    Airway    Mallampati score: I         Dental    upper dentures,     Cardiovascular      Pulmonary      Other Findings        Anesthesia Plan  ASA Score- 2     Anesthesia Type- IV sedation with anesthesia with ASA Monitors. Additional Monitors:     Airway Plan:            Plan Factors-Exercise tolerance (METS): >4 METS. Chart reviewed. EKG reviewed. Existing labs reviewed. Patient summary reviewed. Patient is not a current smoker. Patient did not smoke on day of surgery. Obstructive sleep apnea risk education given perioperatively. Induction- intravenous. Postoperative Plan- Plan for postoperative opioid use. Informed Consent- Anesthetic plan and risks discussed with patient. I personally reviewed this patient with the CRNA. Discussed and agreed on the Anesthesia Plan with the CRNA. Bernabe Dennis

## 2023-10-13 NOTE — ANESTHESIA POSTPROCEDURE EVALUATION
Post-Op Assessment Note    CV Status:  Stable  Pain Score: 0    Pain management: adequate     Mental Status:  Awake and sleepy   Hydration Status:  Euvolemic   PONV Controlled:  Controlled   Airway Patency:  Patent      Post Op Vitals Reviewed: Yes      Staff: Anesthesiologist         There were no known notable events for this encounter.     BP   104/64   Temp   98.9   Pulse 70   Resp 12   SpO2 96

## 2023-10-13 NOTE — TELEPHONE ENCOUNTER
I called Myriam Moore in response to a referral that was received for patient to establish care with Medical Oncology. Outreach was made to schedule a consultation. I left a voicemail explaining the reason for my call and advised patient to call [facility] at [phone]. Another attempt will be made to contact patient.

## 2023-10-13 NOTE — TELEPHONE ENCOUNTER
I called Brandon Garcia in response to a referral that was received for patient to establish care with Medical Oncology. Outreach was made to schedule a consultation. I left a voicemail explaining the reason for my call and advised patient to call [facility] at [phone]. Another attempt will be made to contact patient.

## 2023-10-16 ENCOUNTER — TELEPHONE (OUTPATIENT)
Dept: INTERVENTIONAL RADIOLOGY/VASCULAR | Facility: HOSPITAL | Age: 63
End: 2023-10-16

## 2023-10-16 ENCOUNTER — HOSPITAL ENCOUNTER (OUTPATIENT)
Dept: GASTROENTEROLOGY | Facility: HOSPITAL | Age: 63
Setting detail: OUTPATIENT SURGERY
Discharge: HOME | End: 2023-10-16
Attending: INTERNAL MEDICINE
Payer: COMMERCIAL

## 2023-10-16 ENCOUNTER — HOSPITAL ENCOUNTER (OUTPATIENT)
Dept: RADIOLOGY | Facility: HOSPITAL | Age: 63
Discharge: HOME | End: 2023-10-16
Payer: COMMERCIAL

## 2023-10-16 VITALS
RESPIRATION RATE: 20 BRPM | BODY MASS INDEX: 24.79 KG/M2 | DIASTOLIC BLOOD PRESSURE: 81 MMHG | HEART RATE: 85 BPM | SYSTOLIC BLOOD PRESSURE: 141 MMHG | HEIGHT: 72 IN | OXYGEN SATURATION: 93 % | WEIGHT: 183 LBS | TEMPERATURE: 96.4 F

## 2023-10-16 DIAGNOSIS — E80.6 HYPERBILIRUBINEMIA: ICD-10-CM

## 2023-10-16 PROCEDURE — C2617 STENT, NON-COR, TEM W/O DEL: HCPCS

## 2023-10-16 PROCEDURE — 74328 X-RAY BILE DUCT ENDOSCOPY: CPT

## 2023-10-16 PROCEDURE — C1769 GUIDE WIRE: HCPCS

## 2023-10-16 PROCEDURE — C1726 CATH, BAL DIL, NON-VASCULAR: HCPCS

## 2023-10-16 RX ORDER — FENTANYL CITRATE/PF 50 MCG/ML
25 SYRINGE (ML) INJECTION
OUTPATIENT
Start: 2023-10-16

## 2023-10-16 RX ORDER — INDOMETHACIN 50 MG/1
SUPPOSITORY RECTAL AS NEEDED
Status: COMPLETED | OUTPATIENT
Start: 2023-10-16 | End: 2023-10-16

## 2023-10-16 RX ORDER — ALBUTEROL SULFATE 2.5 MG/3ML
2.5 SOLUTION RESPIRATORY (INHALATION) ONCE AS NEEDED
OUTPATIENT
Start: 2023-10-16

## 2023-10-16 RX ORDER — LEVOFLOXACIN 500 MG/1
500 TABLET, FILM COATED ORAL EVERY 24 HOURS
Qty: 5 TABLET | Refills: 0 | Status: ON HOLD | OUTPATIENT
Start: 2023-10-16 | End: 2023-10-21

## 2023-10-16 RX ORDER — MEPERIDINE HYDROCHLORIDE 25 MG/ML
12.5 INJECTION INTRAMUSCULAR; INTRAVENOUS; SUBCUTANEOUS
OUTPATIENT
Start: 2023-10-16

## 2023-10-16 RX ORDER — ONDANSETRON 2 MG/ML
4 INJECTION INTRAMUSCULAR; INTRAVENOUS ONCE AS NEEDED
OUTPATIENT
Start: 2023-10-16

## 2023-10-16 RX ORDER — HYDROMORPHONE HCL/PF 1 MG/ML
0.5 SYRINGE (ML) INJECTION
OUTPATIENT
Start: 2023-10-16

## 2023-10-16 RX ORDER — PROMETHAZINE HYDROCHLORIDE 25 MG/ML
12.5 INJECTION, SOLUTION INTRAMUSCULAR; INTRAVENOUS ONCE AS NEEDED
OUTPATIENT
Start: 2023-10-16

## 2023-10-16 RX ADMIN — IOHEXOL 35 ML: 240 INJECTION, SOLUTION INTRATHECAL; INTRAVASCULAR; INTRAVENOUS; ORAL at 16:10

## 2023-10-16 RX ADMIN — INDOMETHACIN 100 MG: 50 SUPPOSITORY RECTAL at 16:03

## 2023-10-16 NOTE — DISCHARGE INSTRUCTIONS
ERCP (Endoscopic Retrograde Cholangiopancreatography)   WHAT YOU NEED TO KNOW:   An ERCP (endoscopic retrograde cholangiopancreatography) is a procedure to examine the ducts of your pancreas or gallbladder. ERCP may also be used to open blocked ducts, or to diagnose problems with your pancreas or gallbladder. An endoscope (thin, flexible tube with a light) will be used for the procedure. DISCHARGE INSTRUCTIONS:   Seek care immediately if:   You have sudden, severe abdominal pain. You have problems swallowing. You have a large amount of black, sticky bowel movements or blood in your bowel movements. You have sudden trouble breathing. You feel weak, lightheaded, or faint or your heart beats faster than normal for you. Contact your healthcare provider if:   You have a fever and chills. You have nausea or are vomiting. Your abdomen is bloated or feels full and hard. You have abdominal pain. You have a large amount of black, sticky bowel movements or blood in your bowel movements. You have not had a bowel movement for 3 days after your procedure. You have rash or hives. You lose your appetite, your skin feels itchy, and your skin turns yellow. You have questions or concerns about your procedure. Self-care:   ·      Rest when you feel it is needed. You may be drowsy for up to 24 hours after your procedure. Return to your daily activities as directed. ·       Ask when you can eat regular foods. Healthy foods include fruits, vegetables, whole-grain breads, low-fat dairy products, beans, lean meats, and fish. ·       Relieve a sore throat with ice chips, liquids, or lozenges as directed. Follow up with your healthcare provider as directed: Write down your questions so you remember to ask them during your visits. If you take a “blood thinner”, please review the specific instructions from your endoscopist about when you should resume it.  These can be found in the “Recommendation” and “Your Medication list” sections of this After Visit Summary.

## 2023-10-16 NOTE — ANESTHESIA POSTPROCEDURE EVALUATION
Post-Op Assessment Note    CV Status:  Stable  Pain Score: 0    Pain management: adequate     Mental Status:  Sleepy   Hydration Status:  Stable   PONV Controlled:  None   Airway Patency:  Patent      Post Op Vitals Reviewed: Yes      Staff: CRNA         No notable events documented.     /79 (10/16/23 1716)    Temp  96.4   Pulse 91 (10/16/23 1716)   Resp (!) 24 (10/16/23 1716)    SpO2 94 % (10/16/23 1716)

## 2023-10-16 NOTE — H&P
History and Physical - [facility]  Magen Arriaga 58 y.o. male MRN: [id number]                  HPI: Magen Arriaga is a 58y.o. year old male who presents for ERCP due to obstructive jaundice      REVIEW OF SYSTEMS: Per the HPI, and otherwise unremarkable. Historical Information   Past Medical History:   Diagnosis Date    GERD (gastroesophageal reflux disease)     Hyperlipidemia     Jaundice      Past Surgical History:   Procedure Laterality Date    COLONOSCOPY  2019    Normal per patient, done elsewhere. ENDOSCOPIC ULTRASOUND (UPPER)       Social History   Social History     Substance and Sexual Activity   Alcohol Use Not Currently     Social History     Substance and Sexual Activity   Drug Use Never     Social History     Tobacco Use   Smoking Status Never   Smokeless Tobacco Never     Family History   Problem Relation Age of Onset    Pancreatic cancer Father     Colon cancer Neg Hx         no known family GI malignancy       Meds/Allergies     (Not in a hospital admission)      No Known Allergies    Objective     Blood pressure 138/79, pulse 98, temperature (!) 97.4 °F (36.3 °C), temperature source Tympanic, resp. rate 18, height 6' (1.829 m), weight 83 kg (183 lb), SpO2 96 %. PHYSICAL EXAM    Gen: NAD  CV: RRR  CHEST: Clear  ABD: soft, NT/ND  EXT: no edema      ASSESSMENT/PLAN:  This is a 58y.o. year old male here for ERCP  Procedure planned : ERCP (Endoscopic Retrograde Cholangio-Pancreatography)    Indication for ERCP :   Suspected obstructive jaundice    Chart reviewed : Yes    Imaging reviewed : Yes    Labs reviewed : Yes    Anticoagulants/ Antiplatelet therapy : No    Discussed procedure with the patient :   The procedure was discussed at length with patient. We discussed regarding the indication of the procedure requiring a therapeutic intervention. We discussed that ERCP is an endoscopic procedure done under anesthesia.   We will advance the endoscope through the mouth down to the duodenum/ampulla. We will attempt to cannulate the bile duct to perform the intervention required. At times this is fairly straightforward but other times it may be more challenging and may require utilization of endoscopic ultrasound or other novel techniques to access the bile duct. We discussed the risks and complications including (but not limited to) failed procedure (may require interventional radiology or surgical intervention), infections, bleeding, perforation and pancreatitis. Bleeding : low    Perforation : Perforation risk is low but if it does happen may require surgical intervention. Pancreatitis : The risk of pancreatitis can range from 2 to 20% depending on the indication for the procedure. Certain patient factors increase the risk of pancreatitis including being young, female, having a history of pancreatitis and indication being done for sphincter of Oddi dysfunction. We do take precautions for pancreatitis including giving indomethacin rectal suppositories which have been shown in the literature to help prevent post ERCP pancreatitis, placement of pancreatic duct stent if pancreatic cannulation is achieved. We discussed the natural course of pancreatitis with the majority of the patient's improving within a day or 2 but some may be more complicated requiring a prolonged hospital stay. Patient agreed to proceed and informed consent signed :  Yes

## 2023-10-16 NOTE — ANESTHESIA PREPROCEDURE EVALUATION
Procedure:  ERCP    Relevant Problems   No relevant active problems        Physical Exam    Airway    Mallampati score: I  TM Distance: >3 FB  Neck ROM: full     Dental       Cardiovascular  Cardiovascular exam normal    Pulmonary  Pulmonary exam normal     Other Findings        Anesthesia Plan  ASA Score- 1     Anesthesia Type- general with ASA Monitors. Additional Monitors:     Airway Plan: ETT. Plan Factors-Exercise tolerance (METS): >4 METS. Chart reviewed. EKG reviewed. Imaging results reviewed. Existing labs reviewed. Patient summary reviewed. Patient is not a current smoker. Patient did not smoke on day of surgery. Obstructive sleep apnea risk education given perioperatively. Induction- intravenous. Postoperative Plan- Plan for postoperative opioid use. Planned trial extubation    Informed Consent- Anesthetic plan and risks discussed with patient. I personally reviewed this patient with the CRNA. Discussed and agreed on the Anesthesia Plan with the CRNA. Vannesa Quarles

## 2023-10-17 ENCOUNTER — HOSPITAL ENCOUNTER (OUTPATIENT)
Dept: RADIOLOGY | Age: 63
Discharge: HOME | End: 2023-10-17
Payer: COMMERCIAL

## 2023-10-17 ENCOUNTER — HOSPITAL ENCOUNTER (OUTPATIENT)
Dept: VASCULAR ULTRASOUND | Facility: HOSPITAL | Age: 63
Discharge: HOME | End: 2023-10-17
Payer: COMMERCIAL

## 2023-10-17 DIAGNOSIS — C78.7 MALIGNANT NEOPLASM METASTATIC TO LIVER (HCC): ICD-10-CM

## 2023-10-17 DIAGNOSIS — K63.5 POLYP OF COLON, UNSPECIFIED PART OF COLON, UNSPECIFIED TYPE: ICD-10-CM

## 2023-10-17 DIAGNOSIS — R60.0 LOWER EXTREMITY EDEMA: ICD-10-CM

## 2023-10-17 LAB — GLUCOSE SERPL-MCNC: 104 MG/DL (ref 65–140)

## 2023-10-17 PROCEDURE — 93970 EXTREMITY STUDY: CPT

## 2023-10-17 PROCEDURE — 88160 CYTOPATH SMEAR OTHER SOURCE: CPT | Performed by: PATHOLOGY

## 2023-10-17 PROCEDURE — 82948 REAGENT STRIP/BLOOD GLUCOSE: CPT

## 2023-10-17 PROCEDURE — 78815 PET IMAGE W/CT SKULL-THIGH: CPT

## 2023-10-17 PROCEDURE — 93970 EXTREMITY STUDY: CPT | Performed by: SURGERY

## 2023-10-17 PROCEDURE — 88305 TISSUE EXAM BY PATHOLOGIST: CPT | Performed by: PATHOLOGY

## 2023-10-17 PROCEDURE — G1004 CDSM NDSC: HCPCS

## 2023-10-17 PROCEDURE — A9552 F18 FDG: HCPCS

## 2023-10-18 ENCOUNTER — ANESTHESIA EVENT (OUTPATIENT)
Dept: ANESTHESIOLOGY | Facility: HOSPITAL | Age: 63
End: 2023-10-18

## 2023-10-18 ENCOUNTER — ANESTHESIA (OUTPATIENT)
Dept: ANESTHESIOLOGY | Facility: HOSPITAL | Age: 63
End: 2023-10-18

## 2023-10-18 PROBLEM — E78.5 HLD (HYPERLIPIDEMIA): Status: ACTIVE | Noted: 2023-01-01

## 2023-10-18 PROBLEM — C25.9 PANCREATIC CANCER (HCC): Status: ACTIVE | Noted: 2023-10-18

## 2023-10-18 PROBLEM — K85.90 ACUTE PANCREATITIS: Status: ACTIVE | Noted: 2023-01-01

## 2023-10-18 PROBLEM — E87.1 HYPONATREMIA: Status: ACTIVE | Noted: 2023-01-01

## 2023-10-18 PROBLEM — K21.9 GERD (GASTROESOPHAGEAL REFLUX DISEASE): Status: ACTIVE | Noted: 2023-10-18

## 2023-10-18 PROBLEM — N17.9 AKI (ACUTE KIDNEY INJURY) (HCC): Status: ACTIVE | Noted: 2023-01-01

## 2023-10-18 NOTE — LETTER
Thank you for allowing us to participate in the care of your patient, Aakash Luke, who was hospitalized from [unfilled] through 10/22/2023 with the admitting diagnosis of acute pancreatitis. Patient had recent endoscopic stenting on 10/16. Had increasing abdominal pain and was brought to the emergency department noted to have acute pancreatitis. Total bilirubin was noted at 36 with a lipase of over thousand. He continued to worsen clinically at this time with worsening liver function and hepatorenal syndrome. Patient and his family decided to pursue hospice care due to increasing pain and cancer burden. If you have any additional questions or would like to discuss further, please feel free to contact me.     Azul Cole, [address] [facility], Hospitalist  [phone]

## 2023-10-18 NOTE — ED PROVIDER NOTES
Pt Name: Magen Arriaga  MRN: [id number]  [address] 1960  Age/Sex: 58 y.o. male  Date of evaluation: 10/18/2023  PCP: Reginald Vasquez MD    [address]    Chief Complaint   Patient presents with    Weakness - Generalized     Pt reports being sent in by oncologist for abnormal lab work. Pt reports "a lot of things are changing really fast". Pt reports feeling tired. Hx pancreatic cancer         HPI    58 y.o. male presenting with generalized weakness and abnormal lab work. Patient has a history of pancreatic cancer, underwent a an endoscopic stenting on 10/16, was noted to have hyponatremia, hypercalcemia, elevated bilirubin on outpatient labs and was sent to the ER. Patient complains of generalized fatigue as well as abdominal pain at his baseline, this abdominal pain is dull, moderate intensity, throughout the entire abdomen, worse with movement better at rest as well as with oxycodone taken prior to arrival.  He denies nausea, vomiting, fever, trauma, chest pain, shortness of breath, other symptoms. Patient was given 1 L normal saline as well as 20 mg of Lasix prior to arrival.    HPI      Past Medical and Surgical History    Past Medical History:   Diagnosis Date    GERD (gastroesophageal reflux disease)     Hyperlipidemia     Jaundice        Past Surgical History:   Procedure Laterality Date    COLONOSCOPY  2019    Normal per patient, done elsewhere. ENDOSCOPIC ULTRASOUND (UPPER)         Family History   Problem Relation Age of Onset    Pancreatic cancer Father     Colon cancer Neg Hx         no known family GI malignancy       Social History     Tobacco Use    Smoking status: Never    Smokeless tobacco: Never   Substance Use Topics    Alcohol use: Not Currently    Drug use: Never           Allergies    No Known Allergies    Home Medications    Prior to Admission medications    Medication Sig Start Date End Date Taking?  Authorizing Provider   APPLE CIDER VINEGAR PO Take 1 capsule by mouth if needed    Historical Provider, MD   atorvastatin (LIPITOR) 20 mg tablet Take 20 mg by mouth daily 9/7/23   Historical Provider, MD   famotidine (PEPCID) 20 mg tablet Take 20 mg by mouth 3 (three) times a day 9/9/23   Historical Provider, MD   levofloxacin (LEVAQUIN) 500 mg tablet Take 1 tablet (500 mg total) by mouth every 24 hours for 5 days 10/16/23 10/21/23  Oliver Sosa MD           Review of Systems    Review of Systems   Constitutional:  Positive for fatigue. Negative for appetite change, chills and diaphoresis. HENT:  Negative for drooling, facial swelling, trouble swallowing and voice change. Respiratory:  Negative for apnea, shortness of breath and wheezing. Cardiovascular:  Negative for chest pain and leg swelling. Gastrointestinal:  Positive for abdominal pain. Negative for abdominal distention, diarrhea, nausea and vomiting. Genitourinary:  Negative for dysuria and urgency. Musculoskeletal:  Negative for arthralgias, back pain, gait problem and neck pain. Skin:  Negative for color change, rash and wound. Neurological:  Negative for seizures, speech difficulty, weakness and headaches. Psychiatric/Behavioral:  Negative for agitation, behavioral problems and dysphoric mood. The patient is not nervous/anxious. All other systems reviewed and negative. Physical Exam      ED Triage Vitals [10/18/23 1713]   Temperature Pulse Respirations Blood Pressure SpO2   97.9 °F (36.6 °C) 94 20 139/74 95 %      Temp Source Heart Rate Source Patient Position - Orthostatic VS BP Location FiO2 (%)   Oral Monitor Sitting Right arm --      Pain Score       3               Physical Exam  Vitals and nursing note reviewed. Constitutional:       General: He is not in acute distress. Appearance: He is well-developed. He is ill-appearing. HENT:      Head: Normocephalic and atraumatic.       Right Ear: External ear normal.      Left Ear: External ear normal.      Nose: Nose normal. No congestion or rhinorrhea. Mouth/Throat:      Mouth: Mucous membranes are dry. Pharynx: Oropharynx is clear. No oropharyngeal exudate or posterior oropharyngeal erythema. Eyes:      Conjunctiva/sclera: Conjunctivae normal.      Pupils: Pupils are equal, round, and reactive to light. Neck:      Trachea: No tracheal deviation. Cardiovascular:      Rate and Rhythm: Normal rate and regular rhythm. Pulses: Normal pulses. Heart sounds: Normal heart sounds. No murmur heard. Pulmonary:      Effort: Pulmonary effort is normal. No respiratory distress. Breath sounds: Normal breath sounds. No stridor. No wheezing or rales. Abdominal:      General: There is distension. Palpations: Abdomen is soft. Tenderness: There is abdominal tenderness. There is no guarding or rebound. Comments: Abdomen diffusely tender to palpation   Musculoskeletal:         General: No deformity. Normal range of motion. Cervical back: Normal range of motion and neck supple. Right lower leg: No edema. Left lower leg: No edema. Skin:     General: Skin is warm and dry. Capillary Refill: Capillary refill takes less than 2 seconds. Coloration: Skin is jaundiced. Findings: No rash. Neurological:      Mental Status: He is alert and oriented to person, place, and time. Psychiatric:         Behavior: Behavior normal.         Thought Content: Thought content normal.         Judgment: Judgment normal.              Diagnostic Results  EKG Interpretation    Rate:  84  BPM  Rhythm:  Normal Sinus Rhythm   Axis:  Normal   Intervals: Normal, no blocks, QTc  449 ms  Q waves:  No pathologic Q waves   T waves:  Normal   ST segments:  No significant elevations or depressions     Impression:  Normal sinus rhythm without evidence of acute ischemia or significant arrhythmia      EKG for comparison: None available    EKG interpreted by me.        Labs:    Results Reviewed       Procedure Component Value Units Date/Time    HS Troponin I 4hr [[id number]] Collected: 10/18/23 2249    Lab Status: In process Specimen: Blood from Arm, Right Updated: 10/18/23 2254    Lipase [[id number]] Collected: 10/18/23 2249    Lab Status: In process Specimen: Blood from Arm, Right Updated: 10/18/23 2254    Lactic acid 2 Hours [[id number]]  (Normal) Collected: 10/18/23 2046    Lab Status: Final result Specimen: Blood from Arm, Right Updated: 10/18/23 2122     LACTIC ACID 1.7 mmol/L     Narrative:      Specimen Icteric. Result may be elevated if tourniquet was used during collection.     HS Troponin I 2hr [[id number]]  (Normal) Collected: 10/18/23 2046    Lab Status: Final result Specimen: Blood from Arm, Right Updated: 10/18/23 2122     hs TnI 2hr 19 ng/L      Delta 2hr hsTnI -1 ng/L     Urine Microscopic [[id number]] Collected: 10/18/23 2033    Lab Status: Final result Specimen: Urine, Clean Catch Updated: 10/18/23 2054     RBC, UA 1-2 /hpf      WBC, UA None Seen /hpf      Epithelial Cells None Seen /hpf      Bacteria, UA Occasional /hpf      Amorphous Crystals, UA Occasional    UA w Reflex to Microscopic w Reflex to Culture [[id number]]  (Abnormal) Collected: 10/18/23 2033    Lab Status: Final result Specimen: Urine, Clean Catch Updated: 10/18/23 2044     Color, UA Dark Yellow     Clarity, UA Turbid     Specific Gravity, UA 1.010     pH, UA 5.5     Leukocytes, UA Negative     Nitrite, UA Negative     Protein, UA Negative mg/dl      Glucose, UA Negative mg/dl      Ketones, UA Negative mg/dl      Urobilinogen, UA <2.0 mg/dl      Bilirubin, UA Large     Occult Blood, UA Small    Bilirubin, direct [[id number]]  (Abnormal) Collected: 10/18/23 1821    Lab Status: Final result Specimen: Blood from Arm, Right Updated: 10/18/23 1933     Bilirubin, Direct 22.15 mg/dL     FLU/RSV/COVID - if FLU/RSV clinically relevant [[id number]]  (Normal) Collected: 10/18/23 1845    Lab Status: Final result Specimen: Nares from Nose Updated: 10/18/23 1931     SARS-CoV-2 Negative     INFLUENZA A PCR Negative     INFLUENZA B PCR Negative     RSV PCR Negative    Narrative:      FOR PEDIATRIC PATIENTS - copy/paste COVID Guidelines URL to browser: [email or web address]    SARS-CoV-2 assay is a Nucleic Acid Amplification assay intended for the  qualitative detection of nucleic acid from SARS-CoV-2 in nasopharyngeal  swabs. Results are for the presumptive identification of SARS-CoV-2 RNA. Positive results are indicative of infection with SARS-CoV-2, the virus  causing COVID-19, but do not rule out bacterial infection or co-infection  with other viruses. Laboratories within the [facility] and its  territories are required to report all positive results to the appropriate  public health authorities. Negative results do not preclude SARS-CoV-2  infection and should not be used as the sole basis for treatment or other  patient management decisions. Negative results must be combined with  clinical observations, patient history, and epidemiological information. This test has not been FDA cleared or approved. This test has been authorized by FDA under an Emergency Use Authorization  (EUA). This test is only authorized for the duration of time the  declaration that circumstances exist justifying the authorization of the  emergency use of an in vitro diagnostic tests for detection of SARS-CoV-2  virus and/or diagnosis of COVID-19 infection under section 564(b)(1) of  the Act, 21 U. S.C. 906BSL-5(H)(1), unless the authorization is terminated  or revoked sooner. The test has been validated but independent review by FDA  and CLIA is pending. Test performed using [employer]: This RT-PCR assay targets N2,  a region unique to SARS-CoV-2. A conserved region in the E-gene was chosen  for pan-Sarbecovirus detection which includes SARS-CoV-2.     According to CMS-2020-01-R, this platform meets the definition of high-throughput technology. Comprehensive metabolic panel [[id number]]  (Abnormal) Collected: 10/18/23 1821    Lab Status: Final result Specimen: Blood from Arm, Right Updated: 10/18/23 1901     Sodium 130 mmol/L      Potassium 3.9 mmol/L      Chloride 94 mmol/L      CO2 24 mmol/L      ANION GAP 12 mmol/L      BUN 35 mg/dL      Creatinine 1.63 mg/dL      Glucose 90 mg/dL      Calcium 11.0 mg/dL      Corrected Calcium 12.0 mg/dL       U/L      ALT 97 U/L      Alkaline Phosphatase 907 U/L      Total Protein 6.0 g/dL      Albumin 2.8 g/dL      Total Bilirubin 35.56 mg/dL      eGFR 43 ml/min/1.73sq m     Narrative:      Specimen Icteric. [place] guidelines for Chronic Kidney Disease (CKD):     Stage 1 with normal or high GFR (GFR > 90 mL/min/1.73 square meters)    Stage 2 Mild CKD (GFR = 60-89 mL/min/1.73 square meters)    Stage 3A Moderate CKD (GFR = 45-59 mL/min/1.73 square meters)    Stage 3B Moderate CKD (GFR = 30-44 mL/min/1.73 square meters)    Stage 4 Severe CKD (GFR = 15-29 mL/min/1.73 square meters)    Stage 5 End Stage CKD (GFR <15 mL/min/1.73 square meters)  Note: GFR calculation is accurate only with a steady state creatinine    Lactic acid [[id number]]  (Abnormal) Collected: 10/18/23 1821    Lab Status: Final result Specimen: Blood from Arm, Right Updated: 10/18/23 1901     LACTIC ACID 2.3 mmol/L     Narrative:      Specimen Icteric. Result may be elevated if tourniquet was used during collection.     Procalcitonin [[id number]]  (Abnormal) Collected: 10/18/23 1821    Lab Status: Final result Specimen: Blood from Arm, Right Updated: 10/18/23 1900     Procalcitonin 2.76 ng/ml     Protime-INR [[id number]]  (Abnormal) Collected: 10/18/23 1821    Lab Status: Final result Specimen: Blood from Arm, Right Updated: 10/18/23 1857     Protime 16.6 seconds      INR 1.27    APTT [[id number]]  (Normal) Collected: 10/18/23 1821    Lab Status: Final result Specimen: Blood from Arm, Right Updated: 10/18/23 1857     PTT 30 seconds     HS Troponin 0hr (reflex protocol) [[id number]]  (Normal) Collected: 10/18/23 1821    Lab Status: Final result Specimen: Blood from Arm, Right Updated: 10/18/23 1855     hs TnI 0hr 20 ng/L     Blood culture #2 [[id number]] Collected: 10/18/23 1845    Lab Status: In process Specimen: Blood from Arm, Right Updated: 10/18/23 1850    CBC and differential [[id number]]  (Abnormal) Collected: 10/18/23 1821    Lab Status: Final result Specimen: Blood from Arm, Right Updated: 10/18/23 1841     WBC 19.63 Thousand/uL      RBC 4.20 Million/uL      Hemoglobin 11.5 g/dL      Hematocrit 34.4 %      MCV 82 fL      MCH 27.4 pg      MCHC 33.4 g/dL      RDW 24.3 %      MPV 10.9 fL      Platelets 584 Thousands/uL      nRBC 0 /100 WBCs      Neutrophils Relative 89 %      Immat GRANS % 2 %      Lymphocytes Relative 2 %      Monocytes Relative 6 %      Eosinophils Relative 0 %      Basophils Relative 1 %      Neutrophils Absolute 17.54 Thousands/µL      Immature Grans Absolute 0.43 Thousand/uL      Lymphocytes Absolute 0.46 Thousands/µL      Monocytes Absolute 1.10 Thousand/µL      Eosinophils Absolute 0.01 Thousand/µL      Basophils Absolute 0.09 Thousands/µL     Blood culture #1 [[id number]] Collected: 10/18/23 1821    Lab Status: In process Specimen: Blood from Arm, Right Updated: 10/18/23 1826            All labs reviewed and utilized in the medical decision making process    Radiology:    CT abdomen pelvis with contrast   Final Result   Findings consistent with acute interstitial pancreatitis, presumably post ERCP-related. Mild to moderate abdominopelvic ascites, increased from prior. Pancreatic tail mass, similar to prior. Diffuse hepatic metastatic disease again seen.    Focal area of hyperenhancement in the inferior right hepatic lobe measuring 0.8 x 0.8 cm, favored to to be postoperative in etiology status post ERCP cholangiogram, although a small area of active extravasation can have a similar appearance and cannot    strictly be excluded by imaging. This can be followed up with short-term follow-up contrast-enhanced CT to ensure resolution as clinically indicated. Abdominal lymphadenopathy presumably metastatic as detailed above, again seen. Mild wall thickening of the ascending and proximal transverse colon at the hepatic flexure, which may be related to portal hypertensive colopathy. Findings discussed with Dr. Colin Pham at 10:31 PM on 10/18/2023                  Workstation performed: [id number]         XR chest portable    (Results Pending)       All radiology studies independently viewed by me and interpreted by the radiologist.    Procedure    CriticalCare Time    Date/Time: 10/18/2023 10:48 PM    Performed by: Lilia Emery MD  Authorized by: Lilia Emery MD    Critical care provider statement:     Critical care time (minutes):  45    Critical care time was exclusive of:  Separately billable procedures and treating other patients    Critical care was necessary to treat or prevent imminent or life-threatening deterioration of the following conditions:  Renal failure, hepatic failure and metabolic crisis    Critical care was time spent personally by me on the following activities:  Obtaining history from patient or surrogate, development of treatment plan with patient or surrogate, discussions with consultants, evaluation of patient's response to treatment, examination of patient, ordering and performing treatments and interventions, ordering and review of laboratory studies, ordering and review of radiographic studies, re-evaluation of patient's condition and review of old charts          ED Course of Care and Re-Assessments      Given IV fluids for resuscitation. Given hydromorphone for pain.     No infectious source identified, pain likely secondary to pancreatitis as well as cancer, but given significant illness given dose of broad-spectrum antibiotics given diagnostic uncertainty. Medications   sodium chloride (PF) 0.9 % injection 3 mL (3 mL Intravenous Given 10/18/23 2122)   HYDROmorphone (DILAUDID) injection 0.5 mg (0.5 mg Intravenous Given 10/18/23 2120)   cefepime (MAXIPIME) 2 g/50 mL dextrose IVPB (2,000 mg Intravenous New Bag 10/18/23 2249)   lactated ringers bolus 1,000 mL (0 mL Intravenous Stopped 10/18/23 2030)   iohexol (OMNIPAQUE) 350 MG/ML injection (MULTI-DOSE) 100 mL (100 mL Intravenous Given 10/18/23 2112)           FINAL IMPRESSION    Final diagnoses:   Dehydration   QUINN (acute kidney injury) ([address])   Direct hyperbilirubinemia   Acute pancreatitis, unspecified complication status, unspecified pancreatitis type   Leukocytosis   Pancreatic cancer ([address])   Hyponatremia   Hypercalcemia         DISPOSITION/PLAN    Presentation as above with direct hyperbilirubinemia, acute pancreatitis, dehydration, acute kidney injury in the setting of metastatic pancreatic cancer. Vital signs reassuring, examination remarkable for impressive jaundice. Labs as above, with significant hyperbilirubinemia a slight downtrend from previous value of 45 yesterday but still very elevated. Also noted to have QUINN likely secondary to dehydration, hyponatremia, hypercalcemia, and leukocytosis. Initial elevated lactate cleared after 1 L fluid bolus, not hypotensive, suspect due to dehydration rather than severe sepsis. Imaging as above. Plan formed for admission to internal medicine, that service contacted, awaiting reply at time of signout.   Time reflects when diagnosis was documented in both MDM as applicable and the Disposition within this note       Time User Action Codes Description Comment    10/18/2023 10:49 PM Oliver Dessert Add [E86.0] Dehydration     10/18/2023 10:49 PM Oliver Dessert Add [N17.9] QUINN (acute kidney injury) ([address])     10/18/2023 10:49 PM Jama BATISTA Add [E80.6] Direct hyperbilirubinemia     10/18/2023 10:50 PM Oliver Dessert Add [K85.90] Pancreatitis     10/18/2023 10:50 PM Scharlene Stain Remove [K85.90] Pancreatitis     10/18/2023 10:50 PM Scharlene Stain Add [K85.90] Acute pancreatitis, unspecified complication status, unspecified pancreatitis type     10/18/2023 10:50 PM Reubin Raisin T Add [T73.265] Leukocytosis     10/18/2023 10:50 PM Scharlene Stain Add [C25.9] Pancreatic cancer ([address])     10/18/2023 10:50 PM Scharlene Stain Modify [E86.0] Dehydration     10/18/2023 10:50 PM Scharlene Stain Modify [K85.90] Acute pancreatitis, unspecified complication status, unspecified pancreatitis type     10/18/2023 10:52 PM Reubin Raisin T Add [E87.1] Hyponatremia     10/18/2023 10:52 PM Scharlene Stain Add [E83.52] Hypercalcemia           ED Disposition       ED Disposition   Admit    Condition   Stable    Date/Time   Wed Oct 18, 2023 10:51 PM    Comment   Case was discussed with AURY and the patient's admission status was agreed to be Admission Status: inpatient status to the service of Dr. Anila Vergara. Follow-up Information    None           PATIENT REFERRED TO:    No follow-up provider specified. DISCHARGE MEDICATIONS:    Patient's Medications   Discharge Prescriptions    No medications on file       No discharge procedures on file. Margaret Enamorado MD    Portions of the record may have been created with voice recognition software. Occasional wrong word or "sound alike" substitutions may have occurred due to the inherent limitations of voice recognition software.   Please read the chart carefully and recognize, using context, where substitutions have occurred     Margaret Enamorado MD  10/18/23 2507

## 2023-10-19 NOTE — ASSESSMENT & PLAN NOTE
Recent Labs     10/18/23  1821   CREATININE 1.63*   EGFR 43     Estimated Creatinine Clearance: 50.3 mL/min (A) (by C-G formula based on SCr of 1.63 mg/dL (H)).     POA 1.63; (baseline 0.9)  Likely pre-renal in setting of dehydration/decreased PO intake  UA showed bilirubinuria, low clinical suspicion for UTI  Patient received 1L LR bolus in ED    Plan:  Urinary retention protocol  Monitor I/O  IV Fluids: Normal saline at 125 cc/hr in setting of pancreatitis and hyponatremia  Monitor BMP daily and observe for downward trend of creatinine  Avoid hypoperfusion of the kidneys, minimize nephrotoxins  Renally dose medications, heparin for DVT prophylaxis

## 2023-10-19 NOTE — PHYSICAL THERAPY NOTE
PT EVALUATION     10/19/23 1016   PT Last Visit   PT Visit Date 10/19/23   Note Type   Note type Evaluation   Pain Assessment   Pain Assessment Tool 0-10   Pain Score 4   Pain Location/Orientation Orientation: Bilateral;Orientation: Lower; Location: Back; Location: Abdomen   Pain Onset/Description Onset: Ongoing;Frequency: Constant/Continuous   Effect of Pain on Daily Activities Limits comfort and mobility   Patient's Stated Pain Goal No pain   Hospital Pain Intervention(s) Repositioned; Ambulation/increased activity; Emotional support; Rest   Restrictions/Precautions   Other Precautions Fall Risk;Bed Alarm; Chair Alarm;Pain  (IV; Jewel Rogers on room air)   Home Living   Type of [address] [place] [address] Two level;Stairs to enter with rails;Bed/bath upstairs;1/2 bath on main level  (Full flight of steps to the second floor)   Bathroom Shower/Tub Walk-in shower   Bathroom Toilet Standard   Bathroom Equipment   (No DME per patient)   [address]   ("I have my mother's walker (RW)")   Prior Function   Level of [place] Independent with ADLs; Independent with functional mobility; Independent with IADLS   Lives With Spouse   Receives Help From Family   IADLs Independent with driving; Independent with meal prep; Independent with medication management   Falls in the last 6 months 0  (Denies)   Comments Patient ambulatory at baseline without an assistive device. However, reports "using my mom's walker to get here"   General   Additional Pertinent History Patient is admitted with abdominal pain, worsening jaundice, rising bilirubin levels, weakness; was sent here by PCP for abnormal labs. Patient has a history of pancreatic cancer with extensive metastasis, hepatic mets.    Family/Caregiver Present No   Cognition   Overall Cognitive Status WFL   Arousal/Participation Cooperative   Orientation Level Oriented X4   Memory Within functional limits   Following Commands Follows multistep commands with increased time or repetition   Comments At least 2 patient identifiers including name and date of birth   Subjective   Subjective "I am really tired but I need to use the bathroom"   RLE Assessment   RLE Assessment WFL  (Grossly 3+/5)   LLE Assessment   LLE Assessment WFL  (Grossly 3+/5)   Bed Mobility   Supine to Sit 5  Supervision   Additional items Assist x 1;Verbal cues; Increased time required; Bedrails;HOB elevated   Sit to Supine 5  Supervision   Additional items Assist x 1;Verbal cues; Increased time required; Bedrails   Transfers   Sit to Stand   (Standby assist)   Additional items Assist x 1;Verbal cues; Increased time required   Stand to Sit   (Standby assist)   Additional items Assist x 1;Verbal cues; Increased time required   Toilet transfer   (Standby assist)   Additional items Assist x 1;Verbal cues; Increased time required  (Grab bar; patient is independent with clothes management; patient is independent with hygiene and is standby assist at sink to wash his hands after toileting)   Ambulation/Elevation   Gait pattern Short stride; Step through pattern;Decreased foot clearance  (Slight, generalized multidirectional unsteadiness)   Gait Assistance   (Standby assist)   Additional items Assist x 1;Verbal cues; Tactile cues   Assistive Device None   Distance 10 to 12 feet x 2 (to/from the bathroom) patient deferred further activity secondary to fatigue after ambulation to/from the bathroom and toileting and requested back to bed   Balance   Static Sitting Fair +   Static Standing Fair   Ambulatory   (Fair/F-)   Endurance Deficit   Endurance Deficit Yes   Endurance Deficit Description Limited standing, ambulation and overall activity endurance   Activity Tolerance   Activity Tolerance Patient limited by fatigue;Patient limited by pain;Treatment limited secondary to medical complications (Comment)  (Generalized weakness/deconditioning)   Nurse Made Aware Yes, [place]   Assessment   Problem List Decreased strength;Decreased endurance; Impaired balance;Decreased mobility; Decreased safety awareness;Pain   Assessment Patient seen for Physical Therapy evaluation. Patient admitted with Acute pancreatitis. Comorbidities affecting patient's physical performance include: HLD, QUINN, pancreatic cancer, metastasis. Personal factors affecting patient at time of initial evaluation include: lives in two story house, stairs to enter home, inability to navigate community distances, inability to navigate level surfaces without external assistance, and inability to perform dynamic tasks in community. Prior to admission, patient was independent with functional mobility without assistive device, independent with ADLS, independent with IADLS, living with spouse in a two level home with 3 steps to enter, ambulating household distance, and ambulating community distances. Please find objective findings from Physical Therapy assessment regarding body systems outlined above with impairments and limitations including weakness, impaired balance, decreased endurance, gait deviations, pain, decreased activity tolerance, decreased functional mobility tolerance, decreased safety awareness, and fall risk. The Barthel Index was used as a functional outcome tool presenting with a score of Barthel Index Score: 50 today indicating marked limitations of functional mobility and ADLS. Patient's clinical presentation is currently unstable/unpredictable as seen in patient's presentation of vital sign response, changing level of pain, increased fall risk, new onset of impairment of functional mobility, decreased endurance, and new onset of weakness. Pt would benefit from continued Physical Therapy treatment to address deficits as defined above and maximize level of functional mobility. As demonstrated by objective findings, the assigned level of complexity for this evaluation is high.     The patient's -[facility] Basic Mobility Inpatient Short Form Raw Score is 17. A Raw score of greater than 16 suggests the patient may benefit from discharge to home. Please also refer to the recommendation of the Physical Therapist for safe discharge planning. Goals   Patient Goals "To build up my strength"   STG Expiration Date 10/29/23   Short Term Goal #1 Patient will: Increase bilateral LE strength 1 grade to facilitate independent mobility, Perform all bed mobility tasks independently to improve pt's independence w/ repositioning for decrease risk of skin breakdown, Perform all transfers independently consistently from various height surfaces in order to improve I w/ engagement w/ real-world environments/situations, Ambulate at least 500+ ft. with least restrictive/or no assistive device independently w/o LOB to facilitate return and engagement w/ previous living environment, Navigate flight of stairs independently with unilateral handrail to either improve independence w/ entering home and/or so patient can fully access living areas in home, Increase all balance 1 grade to decrease risk for falls, Tolerate at least 15 consecutive minutes of activity to demonstrate improved activity tolerance and endurance, and Tolerate 3 hr OOB to faciliate upright tolerance   Plan   Treatment/Interventions ADL retraining;Functional transfer training;LE strengthening/ROM; Elevations; Therapeutic exercise; Endurance training;Patient/family training;Equipment eval/education; Bed mobility;Gait training; Compensatory technique education;OT;Spoke to case management   PT Frequency 3-5x/wk   Discharge Recommendation   PT Discharge Recommendation Home with outpatient rehabilitation   Equipment Recommended   (TBD)   AM-PAC Basic Mobility Inpatient   Turning in Flat Bed Without Bedrails 3   Lying on Back to Sitting on Edge of Flat Bed Without Bedrails 3   Moving Bed to Chair 3   Standing Up From Chair Using Arms 3   Walk in Room 3   Climb 3-5 Stairs With Railing 2   Basic Mobility Inpatient Raw Score 17   Basic Mobility Standardized Score 39.67   Highest Level Of Mobility   -[facility] Goal 5: Stand one or more mins   -HL Achieved 6: Walk 10 steps or more   Barthel Index   Feeding 5   Bathing 0   Grooming Score 0   Dressing Score 5   Bladder Score 10   Bowels Score 10   Toilet Use Score 5   Transfers (Bed/Chair) Score 10   Mobility (Level Surface) Score 0   Stairs Score 5   Barthel Index Score 50   End of Consult   Patient Position at End of Consult Supine; All needs within reach;Bed/Chair alarm activated   Licensure   [place] License Number  [id number] [place], [place] [id number]   Portions of the documentation may have been created using voice recognition software. Occasional wrong word or sound alike substitutions may have occurred due to the inherent limitation of the voice recognition software. Read the chart carefully and recognize, using context, where substitutions have occurred.

## 2023-10-19 NOTE — CONSULTS
Visited w/Pt and his sister for about 30 minutes on 10/19/23. Pt stated he has been diagnosed with pancreatic CA and has been "very down: since that news, however, he stated that today he received some hopeful news regarding some of his "numbers" and blood work, that he is not in pain, and that he has been receiving many expressions of love and support from his family and friends, which buoy him. Pt stated that he might wish to have his Confession heard. Pt stated he is [employer] and I asked him if he would like a [place]  to hear his Confession to which he replied affirmatively. We did speak a bit (at Pt's invitation) about preparation for the Sacrament of Confession/Reconciliation which seemed to make the Pt feel better, but a bit more emotional (same for his sister, bedside). At Pt's request, we prayed and gave [place] to both Pt and sister. I will inform Ilda Torres (volunteer visiting [place] ) of Pt's wish to Confess.

## 2023-10-19 NOTE — ED NOTES
Called patient's wife per patient and wife's request to inform her of patient's disposition and status. [place] was appreciative and declined any questions.       Diya Blankenship RN  10/18/23 4340

## 2023-10-19 NOTE — UTILIZATION REVIEW
Initial Clinical Review    Admission: Date/Time/Statement:   Admission Orders (From admission, onward)       Ordered        10/18/23 2315  INPATIENT ADMISSION  Once                          Orders Placed This Encounter   Procedures    INPATIENT ADMISSION     Standing Status:   Standing     Number of Occurrences:   1     Order Specific Question:   Level of Care     Answer:   Med Surg [16]     Order Specific Question:   Estimated length of stay     Answer:   More than 2 Midnights     Order Specific Question:   Certification     Answer:   I certify that inpatient services are medically necessary for this patient for a duration of greater than two midnights. See H&P and MD Progress Notes for additional information about the patient's course of treatment. ED Arrival Information       Expected   -    Arrival   10/18/2023 16:59    Acuity   Emergent              Means of arrival   Wheelchair    Escorted by   Family Member    Service   Hospitalist    Admission type   Emergency              Arrival complaint   Pancreatic cancer  possible dehydration  jaundice             Chief Complaint   Patient presents with    Weakness - Generalized     Pt reports being sent in by oncologist for abnormal lab work. Pt reports "a lot of things are changing really fast". Pt reports feeling tired. Hx pancreatic cancer       Initial Presentation: 58 y.o. male with hx metastatic pancreatic cancer, GERD, HTN, HLD, and recent endoscopic stenting 10/16/2021 who presents to ED from home with abdominal pain and worsening jaundice with rising bilirubin level seen on outpt labs since procedure. On exam,pt alert, oriented . Abdomen distended, + mass, has diffuse abdominal tenderness, + fluid wave . Skin jaundiced. Labs - T Carlos Enrique level 35 ,   22 direct. Elevated LFT's . Lipase  1016. Low na 130 , elevated creat 1.63 from baseline  0.9-1.0. Leukocytosis 19.63 . Elevated calcium . CT in ED showed evidence of acute pancreatitis. Mild to moderate abdominopelvic ascites, increased from prior . Pt given IVF, IV analgesics, IV abx in ED. Admitted as Inpatient with acute pancreatitis -Likely post-ERCP acute pancreatitis with biliary obstruction. QUINN. Hyponatremia . Plan - GI consult, pain control. IVF. IV abx -cefepime. Daily CBC, CMP. NPO . Consider SIADH workup if no Na correction with IVF     Date:10/19   Day 2:   GI consult- CXR shows small left pleural effusion and left lower lobe atelectasis versus pneumonitis . Pancreatitis , ascites on  CT A/P . Pt receiving IVF, Iv abx . On exam, abdominal distention, diffuse tenderness to palpation more prominent in the mid epigastric region, hepatomegaly . +1 to+2 BLE pitting edema. Scleral icterus . Diffuse jaundice . No bm x 4 days . T bili and LFT's down from admission . Plan -  Consider MRCP to further evaluate biliary system as well as stent function versus ERCP or percutaneous transhepatic cholangiogram . Consider Paracentesis; Diagnostic and Therapeutic, not completely unreasonable to rule out SBP . Consider hepatorenal syndrome in the setting of hepatic metastasis  . Monitor LFT's .            ED Triage Vitals [10/18/23 1713]   Temperature Pulse Respirations Blood Pressure SpO2   97.9 °F (36.6 °C) 94 20 139/74 95 %      Temp Source Heart Rate Source Patient Position - Orthostatic VS BP Location FiO2 (%)   Oral Monitor Sitting Right arm --      Pain Score       3          Wt Readings from Last 1 Encounters:   10/16/23 83 kg (183 lb)     Additional Vital Signs:   e/Time Temp Pulse Resp BP MAP (mmHg) SpO2   10/19/23 07:54:23 97.5 °F (36.4 °C) 87 18 123/74 90 92 %   10/19/23 00:54:03 97.5 °F (36.4 °C) 79 18 124/66 85 93 %   10/19/23 0004 -- 80 -- 119/73 91 95 %   10/18/23 2334 -- 80 -- 121/66 90 95 %   10/18/23 2300 -- 79 -- 121/71 91 95 %   10/18/23 2249 -- 79 -- 126/76 95 96 %     Pertinent Labs/Diagnostic Test Results:   CT abdomen pelvis with contrast   Final Result by Josh Staton DO (10/18 2240) Findings consistent with acute interstitial pancreatitis, presumably post ERCP-related. Mild to moderate abdominopelvic ascites, increased from prior. Pancreatic tail mass, similar to prior. Diffuse hepatic metastatic disease again seen. Focal area of hyperenhancement in the inferior right hepatic lobe measuring 0.8 x 0.8 cm, favored to to be postoperative in etiology status post ERCP cholangiogram, although a small area of active extravasation can have a similar appearance and cannot    strictly be excluded by imaging. This can be followed up with short-term follow-up contrast-enhanced CT to ensure resolution as clinically indicated. Abdominal lymphadenopathy presumably metastatic as detailed above, again seen. Mild wall thickening of the ascending and proximal transverse colon at the hepatic flexure, which may be related to portal hypertensive colopathy. Findings discussed with Dr. Zoë Rodriguez at 10:31 PM on 10/18/2023                  Workstation performed: [id number]         XR chest portable   Final Result by Angela Caceres MD (10/19 0825)         Small left pleural effusion. Patchy airspace opacity at the left lung base may resent atelectasis or pneumonitis.                   Workstation performed: [id number]           Results from last 7 days   Lab Units 10/18/23  1845   SARS-COV-2  Negative     Results from last 7 days   Lab Units 10/19/23  0543 10/18/23  1821   WBC Thousand/uL 18.11* 19.63*   HEMOGLOBIN g/dL 11.0* 11.5*   HEMATOCRIT % 32.6* 34.4*   PLATELETS Thousands/uL 194 258   NEUTROS ABS Thousands/µL  --  17.54*         Results from last 7 days   Lab Units 10/19/23  0543 10/18/23  1821   SODIUM mmol/L 131* 130*   POTASSIUM mmol/L 4.0 3.9   CHLORIDE mmol/L 96 94*   CO2 mmol/L 25 24   ANION GAP mmol/L 10 12   BUN mg/dL 42* 35*   CREATININE mg/dL 1.80* 1.63*   EGFR ml/min/1.73sq m 38 43   CALCIUM mg/dL 9.5 11.0*     Results from last 7 days   Lab Units 10/19/23  0543 10/18/23  1821 AST U/L 161* 167*   ALT U/L 90* 97*   ALK PHOS U/L 806* 907*   TOTAL PROTEIN g/dL 5.4* 6.0*   ALBUMIN g/dL 2.5* 2.8*   TOTAL BILIRUBIN mg/dL 31.51* 35.56*   BILIRUBIN DIRECT mg/dL  --  22.15*     Results from last 7 days   Lab Units 10/17/23  1218   POC GLUCOSE mg/dl 104     Results from last 7 days   Lab Units 10/19/23  0543 10/18/23  1821   GLUCOSE RANDOM mg/dL 83 90               Results from last 7 days   Lab Units 10/18/23  2249 10/18/23  2046 10/18/23  1821   HS TNI 0HR ng/L  --   --  20   HS TNI 2HR ng/L  --  19  --    HSTNI D2 ng/L  --  -1  --    HS TNI 4HR ng/L 18  --   --    HSTNI D4 ng/L -2  --   --          Results from last 7 days   Lab Units 10/18/23  1821 10/18/23  1134   PROTIME seconds 16.6* 16.3*   INR  1.27* 1.33*   PTT seconds 30  --          Results from last 7 days   Lab Units 10/18/23  1821   PROCALCITONIN ng/ml 2.76*     Results from last 7 days   Lab Units 10/18/23  2046 10/18/23  1821   LACTIC ACID mmol/L 1.7 2.3*                                 Results from last 7 days   Lab Units 10/18/23  2249   LIPASE u/L 1,016*                 Results from last 7 days   Lab Units 10/18/23  2033   CLARITY UA  Turbid   COLOR UA  Dark Yellow   SPEC GRAV UA  1.010   PH UA  5.5   GLUCOSE UA mg/dl Negative   KETONES UA mg/dl Negative   BLOOD UA  Small*   PROTEIN UA mg/dl Negative   NITRITE UA  Negative   BILIRUBIN UA  Large*   UROBILINOGEN UA (BE) mg/dl <2.0   LEUKOCYTES UA  Negative   WBC UA /hpf None Seen   RBC UA /hpf 1-2   BACTERIA UA /hpf Occasional   EPITHELIAL CELLS WET PREP /hpf None Seen     Results from last 7 days   Lab Units 10/18/23  1845   INFLUENZA A PCR  Negative   INFLUENZA B PCR  Negative   RSV PCR  Negative                             Results from last 7 days   Lab Units 10/18/23  1845 10/18/23  1821   BLOOD CULTURE  Received in Microbiology Lab. Culture in Progress. Received in Microbiology Lab. Culture in Progress.                    ED Treatment:   Medication Administration from 10/18/2023 1659 to 10/19/2023 0036         Date/Time Order Dose Route Action     10/18/2023 2122 EDT sodium chloride (PF) 0.9 % injection 3 mL 3 mL Intravenous Given     10/18/2023 2030 EDT lactated ringers bolus 1,000 mL 0 mL Intravenous Stopped     10/18/2023 1930 EDT lactated ringers bolus 1,000 mL 1,000 mL Intravenous New Bag     10/18/2023 2120 EDT HYDROmorphone (DILAUDID) injection 0.5 mg 0.5 mg Intravenous Given     10/18/2023 2112 EDT iohexol (OMNIPAQUE) 350 MG/ML injection (MULTI-DOSE) 100 mL 100 mL Intravenous Given     10/18/2023 2319 EDT cefepime (MAXIPIME) 2 g/50 mL dextrose IVPB 0 mg Intravenous Stopped     10/18/2023 2249 EDT cefepime (MAXIPIME) 2 g/50 mL dextrose IVPB 2,000 mg Intravenous New Bag     10/19/2023 0015 EDT sodium chloride 0.9 % infusion 125 mL/hr Intravenous New Bag          Past Medical History:   Diagnosis Date    GERD (gastroesophageal reflux disease)     Hyperlipidemia     Jaundice      Present on Admission:  **None**      Admitting Diagnosis: Hypercalcemia [E83.52]  Dehydration [E86.0]  Hyponatremia [E87.1]  Leukocytosis [D72.829]  Pancreatic cancer (HCC) [C25.9]  Weakness [R53.1]  QUINN (acute kidney injury) ([address]) [N17.9]  Direct hyperbilirubinemia [E80.6]  Acute pancreatitis, unspecified complication status, unspecified pancreatitis type [K85.90]  Age/Sex: 58 y.o. male  Admission Orders:  Scheduled Medications:  atorvastatin, 20 mg, Oral, Daily  cefepime, 2,000 mg, Intravenous, Q12H  famotidine, 20 mg, Oral, Daily  heparin (porcine), 5,000 Units, Subcutaneous, Q8H [address]  sodium chloride (PF), 3 mL, Intravenous, Q12H [address]      Continuous IV Infusions:  sodium chloride, 125 mL/hr, Intravenous, Continuous      PRN Meds:  HYDROmorphone, 0.5 mg, Intravenous, Q4H PRN  oxyCODONE, 5 mg, Oral, Q6H PRN x1 10/19    NPO   IP CONSULT TO GASTROENTEROLOGY  IP CONSULT TO [facility] Utilization Review Department  ATTENTION: Please call with any questions or concerns to [phone] and carefully listen to the prompts so that you are directed to the right person. All voicemails are confidential.   For Discharge needs, contact Care Management DC Support Team at [phone] opt. 2  Send all requests for admission clinical reviews, approved or denied determinations and any other requests to dedicated fax number below belonging to the campus where the patient is receiving treatment.  List of dedicated fax numbers for the Facilities:  [place] DENIALS (Administrative/Medical Necessity) [phone]   DISCHARGE SUPPORT TEAM (NETWORK) [id number] (Maternity/NICU/Pediatrics) [phone]   [facility] mSpot [address] [place] [address] [address] [facility] [phone]   [address] [place] [address] [address] [address] [facility] [address] [phone]

## 2023-10-19 NOTE — OCCUPATIONAL THERAPY NOTE
Occupational Therapy Evaluation     Patient Name: Michael SMITH Date: 10/19/2023  Problem List  Principal Problem:    Acute pancreatitis  Active Problems:    Hyponatremia    HLD (hyperlipidemia)    GERD (gastroesophageal reflux disease)    QUINN (acute kidney injury) [facility])    Past Medical History  Past Medical History:   Diagnosis Date    GERD (gastroesophageal reflux disease)     Hyperlipidemia     Jaundice      Past Surgical History  Past Surgical History:   Procedure Laterality Date    COLONOSCOPY  2019    Normal per patient, done elsewhere. ENDOSCOPIC ULTRASOUND (UPPER)             10/19/23 1348   OT Last Visit   OT Visit Date 10/19/23   Note Type   Note type Evaluation   Pain Assessment   Pain Assessment Tool 0-10   Pain Score No Pain   Patient's Stated Pain Goal No pain   Hospital Pain Intervention(s) Repositioned; Ambulation/increased activity; Emotional support   Multiple Pain Sites No   Restrictions/Precautions   Weight Bearing Precautions Per Order No   Other Precautions Chair Alarm; Bed Alarm;Multiple lines; Fall Risk   Home Living   Type of [address] [place] [address] Two level;Performs ADLs on one level; Able to live on main level with bedroom/bathroom;Stairs to enter with rails  (3 ROSANNA; FFOS to bedroom/full bathroom)   Bathroom Shower/Tub Walk-in shower   Bathroom Toilet Standard   Bathroom Equipment Other (Comment)  (None)   [address] Other (Comment)  (None per pt)   Additional Comments Pt has been maintaining FFSU on the couch PTA. Needing increased assistance to access second level of the home via [address]. Prior Function   Level of [place] Independent with ADLs; Independent with functional mobility; Independent with IADLS   Lives With Spouse   Receives Help From Family;Friend(s)   IADLs Independent with driving; Independent with meal prep; Independent with medication management   Falls in the last 6 months 0  (Pt denies)   Vocational Other (Comment)  (FT ; on "sick-leave")   Comments PTA, pt reports being fully independent with ADLs/IADLs and fnxl mobility w/o use of AD. Lifestyle   Autonomy At baseline pt is (I) c ADLs/IADLs and fnxl mobility w/o use of AD. Lives with spouse in a [address] [place] [address],[address] Floor, (+) ROSANNA. (+) driving   Reciprocal Relationships Supportive family   Service to Others On sick leave   Intrinsic Gratification "Resting"   General   Additional Pertinent History Pt [facility]18[facility]2023 c recent endoscopic stenting 10/16/2021 who presents with abdominal pain and worsening jaundice with rising bilirubin level since procedure. Dx: acute pancreatitis   Family/Caregiver Present Yes  (Sister)   Additional General Comments PMHx: metastatic pancreatic cancer, GERD, hypertension and hyperlipidemia   Subjective   Subjective "I am just so tired"   ADL   Eating Assistance 6  Modified independent   Grooming Assistance 6  Modified Independent   Grooming Deficit Wash/dry hands  (standing at the sink)   UB Bathing Assistance 5  Supervision/Setup   LB Bathing Assistance 5  Supervision/Setup   UB [facility]  5  Supervision/Setup   Toileting Deficit Clothing management up;Clothing management down;Perineal hygiene   Additional Comments Unable to formally assess LB dressing, grooming and bathing at time of eval. The above levels of assistance are anticipated based on functional performance deficits with use of clinical judgement. Pt is limited by lethargy, decreased activity tolerance and endurance. Bed Mobility   Supine to Sit 5  Supervision   Additional items HOB elevated; Increased time required;LE management   Sit to Supine 5  Supervision   Additional items Increased time required;Verbal cues  (flat bed)   Additional Comments Pt denies dizziness/lightheadedness with postural changes.    Transfers   Sit to Stand 5  Supervision   Additional items Increased time required;Verbal cues   Stand to Sit 5  Supervision   Additional items Increased time required;Verbal cues   Stand pivot 5  Supervision   Additional items Increased time required;Verbal cues  (no AD)   Toilet transfer 5  Supervision   Additional items Increased time required;Verbal cues;Standard toilet   Additional Comments Verbal cues for safety awareness and optimal hand placement for safe transfers, pt with good application of cues. Functional Mobility   Functional Mobility 5  Supervision   Additional Comments for fnxl household distance with directional changes. (A) for IV managment. Noted with overall slow gait. Additional items   (no AD)   Balance   Static Sitting Good   Dynamic Sitting Fair +   Static Standing Fair   Dynamic Standing Fair -   Activity Tolerance   Activity Tolerance Patient limited by fatigue;Treatment limited secondary to medical complications (Comment)  (limited by generalized weakness and fatigue)   Nurse Made Aware Spoke with GISELL Mustafa   RULILIYA Assessment   RUE Assessment WFL  (MMT 5/5 grossly)   LUE Assessment   LUE Assessment WFL  (MMT 5/5 grossly)   Hand Function   Gross Motor Coordination Functional   Fine Motor Coordination Functional   Sensation   Light Touch No apparent deficits  (Pt denies)   Vision-Basic Assessment   Current Vision Wears glasses all the time   Patient Visual Report Other (Comment)  (Pt denies acute visual changes)   Cognition   Overall Cognitive Status WFL   Arousal/Participation Alert; Responsive; Cooperative   Attention Within functional limits   Orientation Level Oriented X4   Memory Within functional limits   Following Commands Follows multistep commands with increased time or repetition   Comments Pt is forgetful at times t/o the session. Assessment   Limitation Decreased ADL status; Decreased endurance;Decreased high-level ADLs; Decreased self-care trans   Prognosis Good   Assessment Patient is a 58 y.o. male seen for OT evaluation at [address] [facility] [facility] following admission on 10/18/2023  s/p Acute pancreatitis. Please see above for comprehensive list of comorbidities and significant PMHx impacting functional performance. At baseline, pt is fully independent with ADLs/IADLs and fnxl mobility. Upon initial evaluation, pt appears to be performing below baseline functional status. Pt requires supervision for UB ADLs, supervision for LB ADLs, supervision for bed mobility, supervision for transfers and supervision for functional mobility household distance with no AD. The AM-PAC & Barthel Index outcome tools were used to assist in determining pt safety w/self care /mobility and appropriate d/c recommendations, see above for score. Occupational performance is affected by the following deficits: endurance ,  decreased muscular strength , decreased standing tolerance for self care tasks , decreased activity tolerance , and impaired memory . Personal/Environmental factors impacting D/C include: steps to enter/navigate the home, FOS to second floor, Assistance needed for ADLs and functional mobility, and High fall risk . Supporting factors include: able to maintain FFSU, accessible home environment, support system available, and attitude towards recovery Patient would benefit from OT services within the acute care setting to maximize level of functional independence in the following areas fall prevention , self-care transfers, functional mobility, and ADLs. From OT standpoint, recommendation at time of D/C would be return to previous environment with outpatient rehabilitation. Goals   Patient Goals to get stronger   LTG Time Frame 10-14   Long Term Goal See below   Plan   Treatment Interventions ADL retraining;Functional transfer training; Endurance training;Patient/family training;Equipment evaluation/education; Compensatory technique education; Energy conservation; Activityengagement   Goal Expiration Date 10/29/23   OT Treatment Day 0   OT Frequency 2-3x/wk   Discharge Recommendation   OT Discharge Recommendation Home with outpatient rehabilitation   Additional Comments  The patient's raw score on the AM-PAC Daily Activity Inpatient Short Form is 22. A raw score of greater than or equal to 19 suggests the patient may benefit from discharge to home. Please refer to the recommendation of the Occupational Therapist for safe discharge planning. AM-PAC Daily Activity Inpatient   Lower Body Dressing 3   Bathing 3   Toileting 4   Upper Body Dressing 4   Grooming 4   Eating 4   Daily Activity Raw Score 22   Daily Activity Standardized Score (Calc for Raw Score >=11) 47. 1   AM-[facility] Applied Cognition Inpatient   Following a Speech/Presentation 4   Understanding Ordinary Conversation 4   Taking Medications 3   Remembering Where Things Are Placed or Put Away 3   Remembering List of 4-5 Errands 3   Taking Care of Complicated Tasks 3   Applied Cognition Raw Score 20   Applied Cognition Standardized Score 41.76   Barthel Index   Feeding 10   Bathing 0   Grooming Score 5   Dressing Score 5   Bladder Score 10   Bowels Score 10   Toilet Use Score 5   Transfers (Bed/Chair) Score 10   Mobility (Level Surface) Score 0   Stairs Score 5   Barthel Index Score 60   End of Consult   Education Provided Yes;Family or social support of family present for education by provider  (Sister present)   Patient Position at End of Consult Supine;Bed/Chair alarm activated; All needs within reach   Nurse Communication Nurse aware of consult       Goals established on initial evaluation in order to achieve pt's goal of getting stronger. Pt will complete UB ADLs Independent   for increased ADL independence within 10 days. Pt will complete LB ADLs Independent   for increased ADL independence within 10 days. Pt will complete toileting Independent   with use of DME for increased ADL independence within 10 days.      Pt will demonstrate proper body mechanics to complete self-care transfers and functional mobility with Independent  and use of LRAD for increased safety and functional independence within 10 days. Pt will demonstrate standing tolerance of 8 min with Independent  and use of LRAD for increased activity tolerance during ADL/IADL tasks within 10 days. Pt will complete bed mobility Independent  for increased independence in repositioning, pressure offloading, and managing comfort. Pt will demonstrate proper body mechanics and fall prevention strategies during 100% of tx sessions for increased safety awareness during ADL/IADLs    Pt will demonstrate activity tolerance of 30 min in therapeutic tasks for increased participation in meaningful activities upon D/C.       Leyla Cuevas, [id number] Merged with [facility] OTR/L   [place] Licensure# [id number]

## 2023-10-19 NOTE — ASSESSMENT & PLAN NOTE
Recent Labs     10/18/23  1821   SODIUM 130*     No results found for: "OSMOUA", "Zondra Maclachlan", "OSMOLALITSER"    Euvolemic with abdominal distention  Likely secondary to decreased PO intake, nausea  With associated QUINN as above    Plan:  Normal Saline infusion 125 mL/hr in setting of pancreatitis  Goal sodium increase 6-8 in 24 hours  Avoid over-correction to prevent osmotic demyelination syndrome  Patient currently NPO   Monitor BMP in AM; may continue q8h if Adjust fluid rate/concentration accordingly  Consider SIADH workup if no correction with IVF (serum osmoles, urine osmoles, urine sodium)

## 2023-10-19 NOTE — ASSESSMENT & PLAN NOTE
Lab Results   Component Value Date    LIPASE 1,016 (H) 10/18/2023     Recent Labs     10/18/23  1821   WBC 19.63*     In setting of pancreatic cancer with extensive metastasis  Patient underwent endoscopic stenting 10/16   Bilirubin rising since that time; 35 currently, 22 direct   Patient has been receiving prophylaxis with Levaquin   Patient presented with abdominal pain with lipase level as above   Received IV cefepime and 1L LR bolus in ED  Likely post-ERCP acute pancreatitis with biliary obstruction   Patient AAOX3, hemodynamically stable in no acute distress at time of admission  Khurram jaundice appreciated on exam      CT A/P: Findings consistent with acute interstitial pancreatitis, presumably post ERCP-related. Mild to moderate abdominopelvic ascites, increased from prior. Pancreatic tail mass, similar to prior. Diffuse hepatic metastatic disease again seen. Focal area of hyperenhancement in the inferior right hepatic lobe measuring 0.8 x 0.8 cm, favored to to be postoperative in etiology status post ERCP cholangiogram, although a small area of active extravasation can have a similar appearance and cannot strictly be excluded by imaging. Plan:   Pain regimen with oxycodone IR and IV dilaudid as needed   Continue IVF with normal saline at 125 cc/hr   Continued antibiotics on admission  Monitor cbc/bmp daily   Consult GI, appreciate recommendations  Patient is currently NPO  Consider short term follow-up contrast CT to ensure resolution of extravasation noted on CT following improvement in renal function.

## 2023-10-19 NOTE — CONSULTS
Consultation - [address]  [facility]  Anabelkena Ismael 58 y.o. male MRN: [id number]  Unit/Bed#: S -01 Encounter: [id number]        Inpatient consult to gastroenterology  Consult performed by: Kait Kwon MD  Consult ordered by: Mary Ann Carlson MD        ASSESSMENT/PLAN:   Pancreatic CA with metastasis/ Pancreatitis  Recently underwent ERCP with sphincterotomy and 2 stent placement on 10/16  Abnormal labs of hyponatremia, hypercalcemia, elevated bilirubin; sent to ER for further evaluation  Complaining of worsened generalized abdominal pain with associated weakness and fatigue  Follows Dr. Jazmin Lopez, Gastroenterology as outpatient  On arrival vitals-afebrile, 94, 139/74  WBC 19.6, hemoglobin 11.5  CMP remarkable for hyponatremia 130, elevated BUN/creatinine 35/1.63  , ALT 97, , T. bili 35.5, direct bilirubin 22.15  Lactate 2.3  INR 1.27  Lipase 1016  CT AP with contrast-acute interstitial pancreatitis, presumably post ERCP related, mild to moderate abdominal/pelvic ascites, pancreatic tail mass similar to prior, diffuse hepatic metastatic disease, focal area of hyperenhancement in the inferior right hepatic lobe favored to be postop in etiology s/p ERCP cholangiogram although a small area of active extravasation can have a similar appearance and cannot be strictly excluded by imaging, recommendation short-term follow-up contrast-enhanced CT, abdominal lymphadenopathy, mild wall thickening of the ascending and proximal transverse colon at the hepatic flexure which may related to portal hypertensive colopathy  Chest x-ray-small left pleural effusion and left lower lobe atelectasis versus pneumonitis  Interventions thus far cefepime and fluid    Plan-  Consider MRCP to further evaluate biliary system as well as stent function versus ERCP or percutaneous transhepatic cholangiogram  Consider Paracentesis; Diagnostic and Therapeutic, not completely unreasonable to rule out SBP  Consider hepatorenal syndrome in the setting of hepatic metastasis    Monitor LFTs  Discussed case with Dr. Ai Segura who performed ERCP with stent placement- Recommended ERCP concern for clotted stents  Also discussed case with IR team, attending will review case  Continue Cefepime and added flagyl       __________________________________________________________    Reason for Consult / Principal Problem: Acute pancreatitis    HPI: Martine Larson is a 58y.o. year old male with a past medical history of pancreatic cancer with hepatic metastasis who presents with Acute pancreatitis. Patient recently underwent ERCP with sphincterotomy and 2 stent placements on 10/16, outpatient labs were abnormal including hyponatremia, hypercalcemia, and elevated total bilirubin where he was then encouraged to complete further evaluation in the emergency department. Patient is complaining of progressively worsening abdominal pain and generalized weakness. Patient also endorses that he has not had a bowel movement for at least 4 days, and notes that he has been recently experiencing constipation. Patient denies fever, chills, NVD, inability to pass gas, sick contacts, not on current chemotherapy, hematemesis, hematochezia, urinary symptoms, dark urine, pruritus, or any other symptoms at this time. Review of Systems: The remainder of the review of systems was negative except for the pertinent positives noted in HPI. Historical Information   Past Medical History:   Diagnosis Date    GERD (gastroesophageal reflux disease)     Hyperlipidemia     Jaundice      Past Surgical History:   Procedure Laterality Date    COLONOSCOPY  2019    Normal per patient, done elsewhere.     ENDOSCOPIC ULTRASOUND (UPPER)       Social History   Social History     Substance and Sexual Activity   Alcohol Use Not Currently     Social History     Substance and Sexual Activity   Drug Use Never     Social History     Tobacco Use   Smoking Status Never   Smokeless Tobacco Never     Family History   Problem Relation Age of Onset    Pancreatic cancer Father     Colon cancer Neg Hx         no known family GI malignancy       Meds/Allergies     Medications Prior to Admission   Medication    APPLE CIDER VINEGAR PO    atorvastatin (LIPITOR) 20 mg tablet    famotidine (PEPCID) 20 mg tablet    levofloxacin (LEVAQUIN) 500 mg tablet     Current Facility-Administered Medications   Medication Dose Route Frequency    atorvastatin (LIPITOR) tablet 20 mg  20 mg Oral Daily    cefepime (MAXIPIME) 2 g/50 mL dextrose IVPB  2,000 mg Intravenous Q12H    famotidine (PEPCID) tablet 20 mg  20 mg Oral Daily    heparin (porcine) subcutaneous injection 5,000 Units  5,000 Units Subcutaneous Q8H [facility]    HYDROmorphone (DILAUDID) injection 0.5 mg  0.5 mg Intravenous Q4H PRN    oxyCODONE (ROXICODONE) IR tablet 5 mg  5 mg Oral Q6H PRN    sodium chloride (PF) 0.9 % injection 3 mL  3 mL Intravenous Q12H DREW    sodium chloride 0.9 % infusion  125 mL/hr Intravenous Continuous       No Known Allergies    Objective     Blood pressure 123/74, pulse 87, temperature 97.5 °F (36.4 °C), resp. rate 18, height 6' (1.829 m), SpO2 92 %.       Intake/Output Summary (Last 24 hours) at 10/19/2023 0849  Last data filed at 10/19/2023 0536  Gross per 24 hour   Intake 1718.75 ml   Output 500 ml   Net 1218.75 ml       PHYSICAL EXAM     GEN: well nourished, well developed, no acute distress  HEENT: Scleral icterus, dry mucosal membranes, no cervical or supraclavicular lymphadenopathy  CV: RRR, no m/r/g  CHEST: CTA b/l, no WRR  ABD: +BS, abdominal distention, diffuse tenderness to palpation more prominent in the mid epigastric region, hepatomegaly   EXT: Bilateral 1-2+ pitting edema  SKIN: Diffuse jaundice, no rashes  NEURO: aaox3, no focal neurologic deficits    Lab Results:   Admission on 10/18/2023   Component Date Value    WBC 10/18/2023 19.63 (H)     RBC 10/18/2023 4.20     Hemoglobin 10/18/2023 11.5 (L)     Hematocrit 10/18/2023 34.4 (L)     MCV 10/18/2023 82     MCH 10/18/2023 27.4     MCHC 10/18/2023 33.4     RDW 10/18/2023 24.3 (H)     MPV 10/18/2023 10.9     Platelets 93/24/6160 258     nRBC 10/18/2023 0     Neutrophils Relative 10/18/2023 89 (H)     Immat GRANS % 10/18/2023 2     Lymphocytes Relative 10/18/2023 2 (L)     Monocytes Relative 10/18/2023 6     Eosinophils Relative 10/18/2023 0     Basophils Relative 10/18/2023 1     Neutrophils Absolute 10/18/2023 17.54 (H)     Immature Grans Absolute 10/18/2023 0.43 (H)     Lymphocytes Absolute 10/18/2023 0.46 (L)     Monocytes Absolute 10/18/2023 1.10     Eosinophils Absolute 10/18/2023 0.01     Basophils Absolute 10/18/2023 0.09     Sodium 10/18/2023 130 (L)     Potassium 10/18/2023 3.9     Chloride 10/18/2023 94 (L)     CO2 10/18/2023 24     ANION GAP 10/18/2023 12     BUN 10/18/2023 35 (H)     Creatinine 10/18/2023 1.63 (H)     Glucose 10/18/2023 90     Calcium 10/18/2023 11.0 (H)     Corrected Calcium 10/18/2023 12.0 (H)     AST 10/18/2023 167 (H)     ALT 10/18/2023 97 (H)     Alkaline Phosphatase 10/18/2023 907 (H)     Total Protein 10/18/2023 6.0 (L)     Albumin 10/18/2023 2.8 (L)     Total Bilirubin 10/18/2023 35.56 (H)     eGFR 10/18/2023 43     LACTIC ACID 10/18/2023 2.3 (HH)     Procalcitonin 10/18/2023 2.76 (H)     Protime 10/18/2023 16.6 (H)     INR 10/18/2023 1.27 (H)     PTT 10/18/2023 30     Blood Culture 10/18/2023 Received in Microbiology Lab. Culture in Progress. Blood Culture 10/18/2023 Received in Microbiology Lab. Culture in Progress.      Color, UA 10/18/2023 Dark Yellow     Clarity, UA 10/18/2023 Turbid     Specific Gravity, UA 10/18/2023 1.010     pH, UA 10/18/2023 5.5     Leukocytes, UA 10/18/2023 Negative     Nitrite, UA 10/18/2023 Negative     Protein, UA 10/18/2023 Negative     Glucose, UA 10/18/2023 Negative     Ketones, UA 10/18/2023 Negative     Urobilinogen, UA 10/18/2023 <2.0     Bilirubin, UA 10/18/2023 Large (A)     Occult Blood, UA 10/18/2023 Small (A)     SARS-CoV-2 10/18/2023 Negative     INFLUENZA A PCR 10/18/2023 Negative     INFLUENZA B PCR 10/18/2023 Negative     RSV PCR 10/18/2023 Negative     hs TnI 0hr 10/18/2023 20     Bilirubin, Direct 10/18/2023 22.15 (H)     hs TnI 2hr 10/18/2023 19     Delta 2hr hsTnI 10/18/2023 -1     LACTIC ACID 10/18/2023 1.7     hs TnI 4hr 10/18/2023 18     Delta 4hr hsTnI 10/18/2023 -2     RBC, UA 10/18/2023 1-2     WBC, UA 10/18/2023 None Seen     Epithelial Cells 10/18/2023 None Seen     Bacteria, UA 10/18/2023 Occasional     Amorphous Crystals, UA 10/18/2023 Occasional     Lipase 10/18/2023 1,016 (H)     Sodium 10/19/2023 131 (L)     Potassium 10/19/2023 4.0     Chloride 10/19/2023 96     CO2 10/19/2023 25     ANION GAP 10/19/2023 10     BUN 10/19/2023 42 (H)     Creatinine 10/19/2023 1.80 (H)     Glucose 10/19/2023 83     Calcium 10/19/2023 9.5     Corrected Calcium 10/19/2023 10.7 (H)     AST 10/19/2023 161 (H)     ALT 10/19/2023 90 (H)     Alkaline Phosphatase 10/19/2023 806 (H)     Total Protein 10/19/2023 5.4 (L)     Albumin 10/19/2023 2.5 (L)     Total Bilirubin 10/19/2023 31.51 (H)     eGFR 10/19/2023 38     WBC 10/19/2023 18.11 (H)     RBC 10/19/2023 4.00     Hemoglobin 10/19/2023 11.0 (L)     Hematocrit 10/19/2023 32.6 (L)     MCV 10/19/2023 82     MCH 10/19/2023 27.5     MCHC 10/19/2023 33.7     RDW 10/19/2023 24.7 (H)     Platelets 36/59/2494 194     MPV 10/19/2023 10.1      Imaging Studies: I have personally reviewed pertinent reports.

## 2023-10-19 NOTE — UTILIZATION REVIEW
NOTIFICATION OF INPATIENT ADMISSION   AUTHORIZATION REQUEST   SERVICING FACILITY:   [address] [place] [address], [address] [place] [address]  Tax ID: [id number]  NPI: [id number]   ATTENDING PROVIDER:  Attending Name and NPI#: Mary MontgomerynAria [[id number]]  Address: [address] [place] [address]), [address] [place] [address]  Phone: [phone]     ADMISSION INFORMATION:  Place of Service: Inpatient [address]  Place of Service Code: 21  Inpatient Admission Date/Time: 10/18/23 11:15 PM  Discharge Date/Time: No discharge date for patient encounter. Admitting Diagnosis Code/Description:  Hypercalcemia [E83.52]  Dehydration [E86.0]  Hyponatremia [E87.1]  Leukocytosis [D72.829]  Pancreatic cancer (HCC) [C25.9]  Weakness [R53.1]  QUINN (acute kidney injury) ([address]) [N17.9]  Direct hyperbilirubinemia [E80.6]  Acute pancreatitis, unspecified complication status, unspecified pancreatitis type [K85.90]     UTILIZATION REVIEW CONTACT:  Ann Marie Lopez, Utilization   Network Utilization Review Department  Phone: [phone]  Fax: [phone]  Email: [email or web address]  Contact for approvals/pending authorizations, clinical reviews, and discharge. PHYSICIAN ADVISORY SERVICES:  Medical Necessity Denial & Vtfn-zh-Dbnv Review  Phone: [phone]  Fax: [phone]  Email: [email or web address]     DISCHARGE SUPPORT TEAM:  For Patients Discharge Needs & Updates  Phone: [phone] opt. 2 Fax: [phone]  Email: [email or web address]

## 2023-10-19 NOTE — H&P
[address] [facility]  H&P  Name: Quincy Cramer 58 y.o. male I MRN: [id number]  Unit/Bed#: S -02 I Date of Admission: 10/18/2023   Date of Service: 10/19/2023 I Hospital Day: 1      Assessment/Plan   * Acute pancreatitis  Assessment & Plan  Lab Results   Component Value Date    LIPASE 1,016 (H) 10/18/2023     Recent Labs     10/18/23  1821   WBC 19.63*     In setting of pancreatic cancer with extensive metastasis  Patient underwent endoscopic stenting 10/16   Bilirubin rising since that time; 35 currently, 22 direct   Patient has been receiving prophylaxis with Levaquin   Patient presented with abdominal pain with lipase level as above   Received IV cefepime and 1L LR bolus in ED  Likely post-ERCP acute pancreatitis with biliary obstruction   Patient AAOX3, hemodynamically stable in no acute distress at time of admission  Khurram jaundice appreciated on exam      CT A/P: Findings consistent with acute interstitial pancreatitis, presumably post ERCP-related. Mild to moderate abdominopelvic ascites, increased from prior. Pancreatic tail mass, similar to prior. Diffuse hepatic metastatic disease again seen. Focal area of hyperenhancement in the inferior right hepatic lobe measuring 0.8 x 0.8 cm, favored to to be postoperative in etiology status post ERCP cholangiogram, although a small area of active extravasation can have a similar appearance and cannot strictly be excluded by imaging. Plan:   Pain regimen with oxycodone IR and IV dilaudid as needed   Continue IVF with normal saline at 125 cc/hr   Continued antibiotics on admission  Monitor cbc/bmp daily   Consult GI, appreciate recommendations  Patient is currently NPO  Consider short term follow-up contrast CT to ensure resolution of extravasation noted on CT following improvement in renal function.     QUINN (acute kidney injury) [facility])  Assessment & Plan  Recent Labs     10/18/23  1821   CREATININE 1.63*   EGFR 43     Estimated Creatinine Clearance: 50.3 mL/min (A) (by C-G formula based on SCr of 1.63 mg/dL (H)). POA 1.63; (baseline 0.9)  Likely pre-renal in setting of dehydration/decreased PO intake  UA showed bilirubinuria, low clinical suspicion for UTI  Patient received 1L LR bolus in ED    Plan:  Urinary retention protocol  Monitor I/O  IV Fluids: Normal saline at 125 cc/hr in setting of pancreatitis and hyponatremia  Monitor BMP daily and observe for downward trend of creatinine  Avoid hypoperfusion of the kidneys, minimize nephrotoxins  Renally dose medications, heparin for DVT prophylaxis      Hyponatremia  Assessment & Plan  Recent Labs     10/18/23  1821   SODIUM 130*     No results found for: "OSMOUA", "Diego Vijay", "OSMOLALITSER"    Euvolemic with abdominal distention  Likely secondary to decreased PO intake, nausea  With associated QUINN as above    Plan:  Normal Saline infusion 125 mL/hr in setting of pancreatitis  Goal sodium increase 6-8 in 24 hours  Avoid over-correction to prevent osmotic demyelination syndrome  Patient currently NPO   Monitor BMP in AM; may continue q8h if Adjust fluid rate/concentration accordingly  Consider SIADH workup if no correction with IVF (serum osmoles, urine osmoles, urine sodium)    GERD (gastroesophageal reflux disease)  Assessment & Plan  Continue home Pepcid, renally adjusted in setting of QUINN     HLD (hyperlipidemia)  Assessment & Plan  Continue home statin           VTE Pharmacologic Prophylaxis: VTE Score: 4 Moderate Risk (Score 3-4) - Pharmacological DVT Prophylaxis Ordered: heparin. Code Status: Level 1 - Full Code   Discussion with family: Updated  (wife) via phone. Anticipated Length of Stay: Patient will be admitted on an inpatient basis with an anticipated length of stay of greater than 2 midnights secondary to pancreatitis with hyperbilirubinemia s/p ERCP.     Chief Complaint: Abdominal pain    History of Present Illness:  Memo Irwin is a 58 y.o. male with a PMH of metastatic pancreatic cancer, GERD, hypertension and hyperlipidemia and recent endoscopic stenting 10/16/2021, who presents with abdominal pain and worsening jaundice with rising bilirubin level since procedure. T Carlos Enrique level 35 on admission. 22 direct. CT in ED showed evidence of acute pancreatitis. Lipase measured 1016. Patient was also noted to be hyponatremic with acute kidney injury. Sodium and creatinine levels 130 and 1.63 respectively. Baseline creatinine 0.9-1.0. Leukocytosis also noted on initial CBC. Patient awake alert and oriented at time of admission. Denied nausea/vomiting/abdominal pain. Admitted to  IM service in setting of acute pancreatitis superimposed on pancreatic malignancy. Provided IV hydration and consulted GI in setting of recent ERCP. Plan to continue supportive management and trend labs pending GI evaluation. Patient currently n.p.o. Review of Systems:  Review of Systems   Constitutional:  Positive for appetite change and fatigue. Negative for chills and fever. HENT:  Negative for ear pain and sore throat. Eyes:  Negative for pain and visual disturbance. Respiratory:  Negative for cough and shortness of breath. Cardiovascular:  Negative for chest pain, palpitations and leg swelling. Gastrointestinal:  Positive for abdominal distention, abdominal pain and nausea. Negative for constipation, diarrhea and vomiting. Genitourinary:  Negative for dysuria and hematuria. Musculoskeletal:  Negative for arthralgias and back pain. Skin:  Negative for color change and rash. Neurological:  Negative for seizures and syncope. All other systems reviewed and are negative. Past Medical and Surgical History:   Past Medical History:   Diagnosis Date    GERD (gastroesophageal reflux disease)     Hyperlipidemia     Jaundice        Past Surgical History:   Procedure Laterality Date    COLONOSCOPY  2019    Normal per patient, done elsewhere.     ENDOSCOPIC ULTRASOUND (UPPER)         Meds/Allergies:  Prior to Admission medications    Medication Sig Start Date End Date Taking? Authorizing Provider   APPLE CIDER VINEGAR PO Take 1 capsule by mouth if needed    Historical Provider, MD   atorvastatin (LIPITOR) 20 mg tablet Take 20 mg by mouth daily 9/7/23   Historical Provider, MD   famotidine (PEPCID) 20 mg tablet Take 20 mg by mouth 3 (three) times a day 9/9/23   Historical Provider, MD   levofloxacin (LEVAQUIN) 500 mg tablet Take 1 tablet (500 mg total) by mouth every 24 hours for 5 days 10/16/23 10/21/23  Key Farooq MD     I have reviewed home medications with patient personally. Allergies: No Known Allergies    Social History:  Marital Status: /Civil Union   Patient Pre-hospital Living Situation: Home  Patient Pre-hospital Level of Mobility: walks  Patient Pre-hospital Diet Restrictions: None  Substance Use History:   Social History     Substance and Sexual Activity   Alcohol Use Not Currently     Social History     Tobacco Use   Smoking Status Never   Smokeless Tobacco Never     Social History     Substance and Sexual Activity   Drug Use Never       Family History:  Family History   Problem Relation Age of Onset    Pancreatic cancer Father     Colon cancer Neg Hx         no known family GI malignancy       Physical Exam:     Vitals:   Blood Pressure: 124/66 (10/19/23 0054)  Pulse: 79 (10/19/23 0054)  Temperature: 97.5 °F (36.4 °C) (10/19/23 0054)  Temp Source: Oral (10/18/23 1713)  Respirations: 18 (10/19/23 0054)  SpO2: 93 % (10/19/23 0054)    Physical Exam  Vitals and nursing note reviewed. Constitutional:       General: He is not in acute distress. Appearance: He is well-developed. He is ill-appearing. He is not toxic-appearing. HENT:      Head: Normocephalic and atraumatic. Eyes:      Conjunctiva/sclera: Conjunctivae normal.   Cardiovascular:      Rate and Rhythm: Normal rate and regular rhythm. Heart sounds: No murmur heard.   Pulmonary: Effort: Pulmonary effort is normal. No respiratory distress. Breath sounds: Normal breath sounds. Abdominal:      General: There is distension. Palpations: Abdomen is soft. There is mass. Tenderness: There is abdominal tenderness. There is no guarding. Hernia: No hernia is present. Comments: Fluid wave +   Musculoskeletal:         General: No swelling. Cervical back: Neck supple. Skin:     General: Skin is warm and dry. Capillary Refill: Capillary refill takes less than 2 seconds. Coloration: Skin is jaundiced. Neurological:      General: No focal deficit present. Mental Status: He is alert and oriented to person, place, and time.    Psychiatric:         Mood and Affect: Mood normal.          Additional Data:     Lab Results:  Results from last 7 days   Lab Units 10/18/23  1821   WBC Thousand/uL 19.63*   HEMOGLOBIN g/dL 11.5*   HEMATOCRIT % 34.4*   PLATELETS Thousands/uL 258   NEUTROS PCT % 89*   LYMPHS PCT % 2*   MONOS PCT % 6   EOS PCT % 0     Results from last 7 days   Lab Units 10/18/23  1821   SODIUM mmol/L 130*   POTASSIUM mmol/L 3.9   CHLORIDE mmol/L 94*   CO2 mmol/L 24   BUN mg/dL 35*   CREATININE mg/dL 1.63*   ANION GAP mmol/L 12   CALCIUM mg/dL 11.0*   ALBUMIN g/dL 2.8*   TOTAL BILIRUBIN mg/dL 35.56*   ALK PHOS U/L 907*   ALT U/L 97*   AST U/L 167*   GLUCOSE RANDOM mg/dL 90     Results from last 7 days   Lab Units 10/18/23  1821   INR  1.27*     Results from last 7 days   Lab Units 10/17/23  1218   POC GLUCOSE mg/dl 104         Results from last 7 days   Lab Units 10/18/23  2046 10/18/23  1821   LACTIC ACID mmol/L 1.7 2.3*   PROCALCITONIN ng/ml  --  2.76*       Lines/Drains:  Invasive Devices       Peripheral Intravenous Line  Duration             Peripheral IV 10/18/23 Right Antecubital <1 day    Peripheral IV 10/18/23 Right;Ventral (anterior) Forearm <1 day                        Imaging: Reviewed radiology reports from this admission including: abdominal/pelvic CT  CT abdomen pelvis with contrast   Final Result by Fei Jack DO (10/18 2240)   Findings consistent with acute interstitial pancreatitis, presumably post ERCP-related. Mild to moderate abdominopelvic ascites, increased from prior. Pancreatic tail mass, similar to prior. Diffuse hepatic metastatic disease again seen. Focal area of hyperenhancement in the inferior right hepatic lobe measuring 0.8 x 0.8 cm, favored to to be postoperative in etiology status post ERCP cholangiogram, although a small area of active extravasation can have a similar appearance and cannot    strictly be excluded by imaging. This can be followed up with short-term follow-up contrast-enhanced CT to ensure resolution as clinically indicated. Abdominal lymphadenopathy presumably metastatic as detailed above, again seen. Mild wall thickening of the ascending and proximal transverse colon at the hepatic flexure, which may be related to portal hypertensive colopathy. Findings discussed with Dr. Anna Gonzáles at 10:31 PM on 10/18/2023                  Workstation performed: [id number]         XR chest portable    (Results Pending)       EKG and Other Studies Reviewed on Admission:   EKG: No EKG obtained. ** Please Note: This note has been constructed using a voice recognition system.  **

## 2023-10-19 NOTE — PLAN OF CARE
Problem: PHYSICAL THERAPY ADULT  Goal: Performs mobility at highest level of function for planned discharge setting. See evaluation for individualized goals. Description: Treatment/Interventions: ADL retraining, Functional transfer training, LE strengthening/ROM, Elevations, Therapeutic exercise, Endurance training, Patient/family training, Equipment eval/education, Bed mobility, Gait training, Compensatory technique education, OT, Spoke to case management  Equipment Recommended:  (TBD)       See flowsheet documentation for full assessment, interventions and recommendations. Note:    Problem List: Decreased strength, Decreased endurance, Impaired balance, Decreased mobility, Decreased safety awareness, Pain  Assessment: Patient seen for Physical Therapy evaluation. Patient admitted with Acute pancreatitis. Comorbidities affecting patient's physical performance include: HLD, QUINN, pancreatic cancer, metastasis. Personal factors affecting patient at time of initial evaluation include: lives in two story house, stairs to enter home, inability to navigate community distances, inability to navigate level surfaces without external assistance, and inability to perform dynamic tasks in community. Prior to admission, patient was independent with functional mobility without assistive device, independent with ADLS, independent with IADLS, living with spouse in a two level home with 3 steps to enter, ambulating household distance, and ambulating community distances. Please find objective findings from Physical Therapy assessment regarding body systems outlined above with impairments and limitations including weakness, impaired balance, decreased endurance, gait deviations, pain, decreased activity tolerance, decreased functional mobility tolerance, decreased safety awareness, and fall risk.   The Barthel Index was used as a functional outcome tool presenting with a score of Barthel Index Score: 50 today indicating marked limitations of functional mobility and ADLS. Patient's clinical presentation is currently unstable/unpredictable as seen in patient's presentation of vital sign response, changing level of pain, increased fall risk, new onset of impairment of functional mobility, decreased endurance, and new onset of weakness. Pt would benefit from continued Physical Therapy treatment to address deficits as defined above and maximize level of functional mobility. As demonstrated by objective findings, the assigned level of complexity for this evaluation is high. The patient's AM-PAC Basic Mobility Inpatient Short Form Raw Score is 17. A Raw score of greater than 16 suggests the patient may benefit from discharge to home. Please also refer to the recommendation of the Physical Therapist for safe discharge planning. PT Discharge Recommendation: Home with outpatient rehabilitation    See flowsheet documentation for full assessment.

## 2023-10-20 ENCOUNTER — HOME CARE VISIT (OUTPATIENT)
Dept: HOME HOSPICE | Facility: HOSPICE | Age: 63
End: 2023-10-20

## 2023-10-20 PROBLEM — A41.9 SEPSIS (HCC): Status: ACTIVE | Noted: 2023-10-20

## 2023-10-20 NOTE — TELEMEDICINE
e-Consult (IPC)  - Interventional Radiology  Mine Basurto 58 y.o. male MRN: [id number]  Unit/Bed#: S [place] 52098 Encounter: [id number]          Interventional Radiology has been consulted to evaluate [address] to IR  Consult performed by: Merrianne Kawasaki, MD  Consult ordered by: Sharon Carpio MD        10/20/23    Assessment/Recommendation:   58 M with hx of pancreatic cancer with liver mets. IR consulted for biliary drain. I reviewed CT showing diffuse liver mets and minimal bilary ductal dilatation. I doubt that bilary drain will make any significant difference in his overall prognosis and is unlikely to improve his hyperbilirubinemia. In the meantime, I was just notified by Dr. Marah Preciado that the patient does not want to biliary drain placement and he wanted to pursue hospice care. I will cancel the biliary drain procedure. 5-10 minutes, >50% of the total time devoted to medical consultative verbal/EMR discussion between providers. Written report will be generated in the EMR. Thank you for allowing Interventional Radiology to participate in the care of Mine Basurto. Please don't hesitate to call or TigerText us with any questions.      Merrianne Kawasaki, MD

## 2023-10-20 NOTE — Clinical Note
I have Erick Shahid [birth year] at [facility] . Admitted 10/19 Roberto,dehydration, ROBERTO,metastatic pancreatic Ca . Patient is jaundice, ascites , pain in back Npo for past 2 days, IVF discontinued yesterday Dilaudid iv started yesterday and had one dose today 0.5 mg at 10 am . I did speak to his nurse who just now gave a oxy 5 mg as still having back pain . No other symptoms but pain at this point . No sob Family and patient have canceled any procedures was scheduled for ERCP ??  Routine level of hospice care

## 2023-10-20 NOTE — CASE MANAGEMENT
Case Management Progress Note    Patient name Rachel Service  Location S /S -01 MRN [id number]  : 1960 Date 10/20/2023       LOS (days): 2  Geometric Mean LOS (GMLOS) (days):   Days to GMLOS:        OBJECTIVE:        Current admission status: Inpatient  Preferred Pharmacy:   [id number] [place] - [address]. [address] [place] [address]  Phone: [phone] Fax: [phone]    Primary Care Provider: Darvin Gonzalez MD    Primary Insurance: [employer]  Secondary Insurance:     PROGRESS NOTE:  Met with pt's wife at bedside who expressed that she and patient had discussed hospice and would like to pursue hospice. Patient does not want to pursue any further treatment. Resident made aware and CM consult received for hospice. Hospice referral placed to [facility]. TT received from hospice liaison that currently pt meets criteria from routine hospice, but will have liaison reassess pt tomorrow to see if pt qualifies for IPU. Patient's wife does not want to take pt home and may want to consider hospice at [facility]. She is aware if this ends up being a plan that room and board will not be covered at [facility].

## 2023-10-20 NOTE — PROGRESS NOTES
[facility]  Progress Note - Arabella Martinez 58 y.o. male MRN: [id number]    Unit/Bed#: S -01 Encounter: [id number]    Assessment/Plan:  Elevated LFTs and Pancreatitis  Recently underwent ERCP with sphincterotomy and 2 stent placement on 10/16  Abnormal labs of hyponatremia, hypercalcemia, elevated bilirubin; sent to ER for further evaluation  Complaining of worsened generalized abdominal pain with associated weakness and fatigue  Follows Dr. Beverly Curiel, Gastroenterology as outpatient  Vitals remain stable, afebrile, 82, 113/64  WBC 22.08  CMP- Na 130  BUN/Cr worsening from yesterday- 58/2.62  Ca 7.9         Tbili 32.05      Plan-  Inital plan was to  IR recommendations to further evaluate biliary system as well as stent function through percutaneous transhepatic cholangiogram  Consider Paracentesis; Diagnostic and Therapeutic  Consider hepatorenal syndrome in the setting of hepatic metastasis  Abdominal KUB- Nonobstructive bowel gas pattern, Small bilateral pleural effusions  Monitor LFTs  Continue Cefepime and Flagyl   Continue LR 200cc/hr     Patient has opted for Hospice care; disregard all intervention listed above  Per primary team no therapeutic paracentesis   GI will sign off at this time. Subjective:   Patient was seen and examined at the bedside. Patient was resting comfortably in no overt acute distress. Patient reports worsening of his abdominal distension, and jaundice. He also endorses that he is not passing any flatus. Patient denies fever, chills, CP, SOB, palpitations, NVD, urinary sx/retention or decreased output, leg pain, confusion, HA, numbness,weakness, or any other sx at this time. Objective:     Vitals: Blood pressure 113/64, pulse 82, temperature (!) 97.4 °F (36.3 °C), temperature source Oral, resp. rate 18, height 6' (1.829 m), SpO2 93 %. ,Body mass index is 24.82 kg/m².     No intake or output data in the 24 hours ending 10/20/23 5003    Physical Exam:    GEN: wn/wd, NAD, ill appearing   HEENT:Dry mucosal membranes, scleral icterus, no cervical or supraclavicular LAD  CV: RRR, no m/r/g  CHEST: CTA b/l, no w/r/r  ABD: Difficult to appreciate bowel sounds , abdominal distension, no diffuse abdominal tenderness to palpation   EXT: 1-2+ pitting edema bilaterally  SKIN: Diffuse jaundice, no rashes  NEURO: aaox3, no focal neurologic deficit      Invasive Devices       Peripheral Intravenous Line  Duration             Peripheral IV 10/18/23 Right Antecubital 1 day    Peripheral IV 10/18/23 Right;Ventral (anterior) Forearm 1 day                            Lab, Imaging and other studies:     Admission on 10/18/2023   Component Date Value    WBC 10/18/2023 19.63 (H)     RBC 10/18/2023 4.20     Hemoglobin 10/18/2023 11.5 (L)     Hematocrit 10/18/2023 34.4 (L)     MCV 10/18/2023 82     MCH 10/18/2023 27.4     MCHC 10/18/2023 33.4     RDW 10/18/2023 24.3 (H)     MPV 10/18/2023 10.9     Platelets 22/70/4764 258     nRBC 10/18/2023 0     Neutrophils Relative 10/18/2023 89 (H)     Immat GRANS % 10/18/2023 2     Lymphocytes Relative 10/18/2023 2 (L)     Monocytes Relative 10/18/2023 6     Eosinophils Relative 10/18/2023 0     Basophils Relative 10/18/2023 1     Neutrophils Absolute 10/18/2023 17.54 (H)     Immature Grans Absolute 10/18/2023 0.43 (H)     Lymphocytes Absolute 10/18/2023 0.46 (L)     Monocytes Absolute 10/18/2023 1.10     Eosinophils Absolute 10/18/2023 0.01     Basophils Absolute 10/18/2023 0.09     Sodium 10/18/2023 130 (L)     Potassium 10/18/2023 3.9     Chloride 10/18/2023 94 (L)     CO2 10/18/2023 24     ANION GAP 10/18/2023 12     BUN 10/18/2023 35 (H)     Creatinine 10/18/2023 1.63 (H)     Glucose 10/18/2023 90     Calcium 10/18/2023 11.0 (H)     Corrected Calcium 10/18/2023 12.0 (H)     AST 10/18/2023 167 (H)     ALT 10/18/2023 97 (H)     Alkaline Phosphatase 10/18/2023 907 (H)     Total Protein 10/18/2023 6.0 (L)     Albumin 10/18/2023 2.8 (L)     Total Bilirubin 10/18/2023 35.56 (H)     eGFR 10/18/2023 43     LACTIC ACID 10/18/2023 2.3 (HH)     Procalcitonin 10/18/2023 2.76 (H)     Protime 10/18/2023 16.6 (H)     INR 10/18/2023 1.27 (H)     PTT 10/18/2023 30     Blood Culture 10/18/2023 No Growth at 24 hrs. Blood Culture 10/18/2023 No Growth at 24 hrs.      Color, UA 10/18/2023 Dark Yellow     Clarity, UA 10/18/2023 Turbid     Specific Gravity, UA 10/18/2023 1.010     pH, UA 10/18/2023 5.5     Leukocytes, UA 10/18/2023 Negative     Nitrite, UA 10/18/2023 Negative     Protein, UA 10/18/2023 Negative     Glucose, UA 10/18/2023 Negative     Ketones, UA 10/18/2023 Negative     Urobilinogen, UA 10/18/2023 <2.0     Bilirubin, UA 10/18/2023 Large (A)     Occult Blood, UA 10/18/2023 Small (A)     SARS-CoV-2 10/18/2023 Negative     INFLUENZA A PCR 10/18/2023 Negative     INFLUENZA B PCR 10/18/2023 Negative     RSV PCR 10/18/2023 Negative     hs TnI 0hr 10/18/2023 20     Bilirubin, Direct 10/18/2023 22.15 (H)     hs TnI 2hr 10/18/2023 19     Delta 2hr hsTnI 10/18/2023 -1     LACTIC ACID 10/18/2023 1.7     hs TnI 4hr 10/18/2023 18     Delta 4hr hsTnI 10/18/2023 -2     RBC, UA 10/18/2023 1-2     WBC, UA 10/18/2023 None Seen     Epithelial Cells 10/18/2023 None Seen     Bacteria, UA 10/18/2023 Occasional     Amorphous Crystals, UA 10/18/2023 Occasional     Lipase 10/18/2023 1,016 (H)     Sodium 10/19/2023 131 (L)     Potassium 10/19/2023 4.0     Chloride 10/19/2023 96     CO2 10/19/2023 25     ANION GAP 10/19/2023 10     BUN 10/19/2023 42 (H)     Creatinine 10/19/2023 1.80 (H)     Glucose 10/19/2023 83     Calcium 10/19/2023 9.5     Corrected Calcium 10/19/2023 10.7 (H)     AST 10/19/2023 161 (H)     ALT 10/19/2023 90 (H)     Alkaline Phosphatase 10/19/2023 806 (H)     Total Protein 10/19/2023 5.4 (L)     Albumin 10/19/2023 2.5 (L)     Total Bilirubin 10/19/2023 31.51 (H)     eGFR 10/19/2023 38     WBC 10/19/2023 18.11 (H)     RBC 10/19/2023 4.00     Hemoglobin 10/19/2023 11.0 (L)     Hematocrit 10/19/2023 32.6 (L)     MCV 10/19/2023 82     MCH 10/19/2023 27.5     MCHC 10/19/2023 33.7     RDW 10/19/2023 24.7 (H)     Platelets 86/44/0879 194     MPV 10/19/2023 10.1     Sodium 10/20/2023 130 (L)     Potassium 10/20/2023 4.0     Chloride 10/20/2023 97     CO2 10/20/2023 21     ANION GAP 10/20/2023 12     BUN 10/20/2023 58 (H)     Creatinine 10/20/2023 2.62 (H)     Glucose 10/20/2023 81     Calcium 10/20/2023 7.9 (L)     Corrected Calcium 10/20/2023 9.2     AST 10/20/2023 268 (H)     ALT 10/20/2023 116 (H)     Alkaline Phosphatase 10/20/2023 772 (H)     Total Protein 10/20/2023 5.3 (L)     Albumin 10/20/2023 2.4 (L)     Total Bilirubin 10/20/2023 32.05 (H)     eGFR 10/20/2023 24     Magnesium 10/20/2023 2.5     Phosphorus 10/20/2023 4.3 (H)     WBC 10/20/2023 22.08 (H)     RBC 10/20/2023 3.92     Hemoglobin 10/20/2023 10.6 (L)     Hematocrit 10/20/2023 32.1 (L)     MCV 10/20/2023 82     MCH 10/20/2023 27.0     MCHC 10/20/2023 33.0     RDW 10/20/2023 25.2 (H)     MPV 10/20/2023 10.2     Platelets 30/21/0913 183     Procalcitonin 10/20/2023 4.56 (H)          I have personally reviewed pertinent reports.       Current Facility-Administered Medications   Medication Dose Route Frequency    acetaminophen (TYLENOL) tablet 650 mg  650 mg Oral Q6H PRN    atorvastatin (LIPITOR) tablet 20 mg  20 mg Oral Daily    cefepime (MAXIPIME) 2 g/50 mL dextrose IVPB  2,000 mg Intravenous Q12H    famotidine (PEPCID) tablet 20 mg  20 mg Oral Daily    heparin (porcine) subcutaneous injection 5,000 Units  5,000 Units Subcutaneous Q8H [address]    HYDROmorphone (DILAUDID) injection 0.5 mg  0.5 mg Intravenous Q4H PRN    lactated ringers infusion  200 mL/hr Intravenous Continuous    metroNIDAZOLE (FLAGYL) IVPB (premix) 500 mg 100 mL  500 mg Intravenous Q8H    oxyCODONE (ROXICODONE) IR tablet 5 mg  5 mg Oral Q6H PRN    sodium chloride (PF) 0.9 % injection 3 mL  3 mL Intravenous Q12H [address]

## 2023-10-20 NOTE — PROGRESS NOTES
[facility]  Progress Note  Name: Michelle Ram I  MRN: [id number]  Unit/Bed#: S -62 I Date of Admission: 10/18/2023   Date of Service: 10/20/2023 I Hospital Day: 2    Assessment/Plan   Pancreatic cancer [facility])  Assessment & Plan  - See PET scan results for diffuse metastatic disease  - Currently following with Dr. Marilyn Andres  - As of 10/20/2023 patient has decided to pursue hospice care  - Hospice and palliative consulted  - Pain control per palliative  -Comfort care orders placed    * Acute pancreatitis  Assessment & Plan  Lab Results   Component Value Date    LIPASE 1,016 (H) 10/18/2023     Recent Labs     10/18/23  1821 10/19/23  0543 10/20/23  0448   WBC 19.63* 18.11* 22.08*       Patient underwent endoscopic stenting 10/16   Patient has been receiving prophylaxis with Levaquin   Received IV cefepime and 1L LR bolus in ED  Likely post-ERCP acute pancreatitis with biliary obstruction   Shubham Friar jaundice appreciated on exam      Plan:   Hospice care being pursued  Pain control per palliative    QUINN (acute kidney injury) [facility])  Assessment & Plan  Recent Labs     10/18/23  1821 10/19/23  0543 10/20/23  0448   CREATININE 1.63* 1.80* 2.62*   EGFR 43 38 24       Estimated Creatinine Clearance: 31.3 mL/min (A) (by C-G formula based on SCr of 2.62 mg/dL (H)).     POA 1.63; (baseline 0.9)  Likely pre-renal in setting of dehydration/decreased PO intake  UA showed bilirubinuria, low clinical suspicion for UTI  Patient received 1L LR bolus in ED  Creatinine continues to uptrend likely in the setting of hepatorenal syndrome    Plan:  Hospice    HLD (hyperlipidemia)  Assessment & Plan  Continue home statin    Hyponatremia  Assessment & Plan  Recent Labs     10/18/23  1821 10/19/23  0543 10/20/23  0448   SODIUM 130* 131* 130*       No results found for: "OSMOUA", "Sandy Midget", "OSMOLALITSER"    Euvolemic with abdominal distention  Likely secondary to decreased PO intake, nausea  With associated QUINN as above    Plan:  Hospice             VTE Pharmacologic Prophylaxis: VTE Score: 4 Moderate Risk (Score 3-4) - Pharmacological DVT Prophylaxis Contraindicated. Sequential Compression Devices Ordered. Patient Centered Rounds: I performed bedside rounds with nursing staff today. Discussions with Specialists or Other Care Team Provider: GI, hospice, palliative    Education and Discussions with Family / Patient: Updated  (wife) at bedside. Current Length of Stay: 2 day(s)  Current Patient Status: Inpatient   Discharge Plan: Anticipate discharge tomorrow to inpatient hospice    Code Status: Level 4 - Comfort Care    Subjective: In AM of 10/20, patient has decided to pursue comfort care and inpatient hospice. Objective:     Vitals:   Temp (24hrs), Av.7 °F (36.5 °C), Min:97.4 °F (36.3 °C), Max:97.8 °F (36.6 °C)    Temp:  [97.4 °F (36.3 °C)-97.8 °F (36.6 °C)] 97.4 °F (36.3 °C)  HR:  [82-89] 82  Resp:  [18] 18  BP: (113-130)/(64-70) 113/64  SpO2:  [93 %] 93 %  Body mass index is 24.82 kg/m². Input and Output Summary (last 24 hours):   No intake or output data in the 24 hours ending 10/20/23 1338    Physical Exam:   Physical Exam  Vitals and nursing note reviewed. Constitutional:       General: He is not in acute distress. Appearance: He is well-developed. He is ill-appearing. He is not toxic-appearing. HENT:      Head: Normocephalic and atraumatic. Eyes:      Conjunctiva/sclera: Conjunctivae normal.   Cardiovascular:      Rate and Rhythm: Normal rate and regular rhythm. Heart sounds: No murmur heard. Pulmonary:      Effort: Pulmonary effort is normal. No respiratory distress. Breath sounds: Normal breath sounds. Abdominal:      General: There is distension. Palpations: Abdomen is soft. There is mass. Tenderness: There is abdominal tenderness. There is no guarding. Hernia: No hernia is present.       Comments: Fluid wave +  Increasing distention Musculoskeletal:         General: No swelling. Cervical back: Neck supple. Skin:     General: Skin is warm and dry. Capillary Refill: Capillary refill takes less than 2 seconds. Coloration: Skin is jaundiced. Neurological:      General: No focal deficit present. Mental Status: He is alert and oriented to person, place, and time. Psychiatric:         Mood and Affect: Mood normal.          Additional Data:     Labs:  Results from last 7 days   Lab Units 10/20/23  0448 10/19/23  0543 10/18/23  1821   WBC Thousand/uL 22.08*   < > 19.63*   HEMOGLOBIN g/dL 10.6*   < > 11.5*   HEMATOCRIT % 32.1*   < > 34.4*   PLATELETS Thousands/uL 183   < > 258   BANDS PCT % 1  --   --    NEUTROS PCT %  --   --  89*   LYMPHS PCT %  --   --  2*   LYMPHO PCT % 0*  --   --    MONOS PCT %  --   --  6   MONO PCT % 2*  --   --    EOS PCT % 1  --  0    < > = values in this interval not displayed.      Results from last 7 days   Lab Units 10/20/23  0448   SODIUM mmol/L 130*   POTASSIUM mmol/L 4.0   CHLORIDE mmol/L 97   CO2 mmol/L 21   BUN mg/dL 58*   CREATININE mg/dL 2.62*   ANION GAP mmol/L 12   CALCIUM mg/dL 7.9*   ALBUMIN g/dL 2.4*   TOTAL BILIRUBIN mg/dL 32.05*   ALK PHOS U/L 772*   ALT U/L 116*   AST U/L 268*   GLUCOSE RANDOM mg/dL 81     Results from last 7 days   Lab Units 10/18/23  1821   INR  1.27*     Results from last 7 days   Lab Units 10/17/23  1218   POC GLUCOSE mg/dl 104         Results from last 7 days   Lab Units 10/20/23  0448 10/18/23  2046 10/18/23  1821   LACTIC ACID mmol/L  --  1.7 2.3*   PROCALCITONIN ng/ml 4.56*  --  2.76*       Lines/Drains:  Invasive Devices       Peripheral Intravenous Line  Duration             Peripheral IV 10/18/23 Right Antecubital 1 day    Peripheral IV 10/18/23 Right;Ventral (anterior) Forearm 1 day                          Imaging: Personally reviewed the following imaging: abdominal/pelvic CT    Recent Cultures (last 7 days):   Results from last 7 days   Lab Units 10/18/23  1845 10/18/23  1821   BLOOD CULTURE  No Growth at 24 hrs. No Growth at 24 hrs. Last 24 Hours Medication List:   Current Facility-Administered Medications   Medication Dose Route Frequency Provider Last Rate    acetaminophen  650 mg Oral Q6H PRN Carina Moore DO      cefepime  2,000 mg Intravenous Q12H Randolph Greene MD 2,000 mg (10/20/23 0305)    famotidine  20 mg Oral Daily Randolph Greene MD      HYDROmorphone  0.5 mg Intravenous Q4H PRN Randolph Greene MD      LORazepam  0.5 mg Intravenous Q4H PRN Zohaib Cody DO      metroNIDAZOLE  500 mg Intravenous Q8H Leonor Morgan  mg (10/20/23 4566)    oxyCODONE  5 mg Oral Q6H PRN Randolph Greene MD      sodium chloride (PF)  3 mL Intravenous Q12H [address] Porfirio Li MD          Today, Patient Was Seen By: Carina Moore DO    **Please Note: This note may have been constructed using a voice recognition system. **

## 2023-10-20 NOTE — ASSESSMENT & PLAN NOTE
Recent Labs     10/18/23  1821 10/19/23  0543 10/20/23  0448   CREATININE 1.63* 1.80* 2.62*   EGFR 43 38 24       Estimated Creatinine Clearance: 31.3 mL/min (A) (by C-G formula based on SCr of 2.62 mg/dL (H)).     POA 1.63; (baseline 0.9)  Likely pre-renal in setting of dehydration/decreased PO intake  UA showed bilirubinuria, low clinical suspicion for UTI  Patient received 1L LR bolus in ED  Creatinine continues to uptrend likely in the setting of hepatorenal syndrome    Plan:  Hospice

## 2023-10-20 NOTE — ASSESSMENT & PLAN NOTE
Lab Results   Component Value Date    LIPASE 1,016 (H) 10/18/2023     Recent Labs     10/18/23  1821 10/19/23  0543 10/20/23  0448   WBC 19.63* 18.11* 22.08*       Patient underwent endoscopic stenting 10/16   Patient has been receiving prophylaxis with Levaquin   Received IV cefepime and 1L LR bolus in ED  Likely post-ERCP acute pancreatitis with biliary obstruction   [place] jaundice appreciated on exam      Plan:   Hospice care being pursued  Pain control per palliative

## 2023-10-20 NOTE — ASSESSMENT & PLAN NOTE
Recent Labs     10/18/23  1821 10/19/23  0543 10/20/23  0448   SODIUM 130* 131* 130*       No results found for: "OSMOUA", "NAUR", "OSMOLALITSER"    Euvolemic with abdominal distention  Likely secondary to decreased PO intake, nausea  With associated QUINN as above    Plan:  Hospice

## 2023-10-20 NOTE — ASSESSMENT & PLAN NOTE
- See PET scan results for diffuse metastatic disease  - Currently following with Dr. Carlyn Merlin  - As of 10/20/2023 patient has decided to pursue hospice care  - Hospice and palliative consulted  - Pain control per palliative  -Comfort care orders placed

## 2023-10-21 PROBLEM — Z51.5 HOSPICE CARE PATIENT: Status: ACTIVE | Noted: 2023-10-21

## 2023-10-21 NOTE — HOSPICE NOTE
Requested to reassess pt for IPU. Pt remains approved for [facility] per Dr Kristie Moreland. However recommended scheduling pain medication for patient and re-assessing again tomorrow AM.     Liaison sent TT to Dr Jeana Hunter and provided recommendation from Dr Pedro Hilliard. Per Dr Jeana Hunter resident will be ordering medications. Liaison will re-asses patient tomorrow morning. Updated CM with plan.

## 2023-10-21 NOTE — PROGRESS NOTES
[facility]  Progress Note  Name: Michelle Ram I  MRN: [id number]  Unit/Bed#: S -75 I Date of Admission: 10/18/2023   Date of Service: 10/21/2023 I Hospital Day: 3    Assessment/Plan   * Hospice care patient  Assessment & Plan  Patient with pancreatic cancer, mets to liver s/p stents/ERCP on 10/16  Presented with acute pancreatitis, liver failure  Transitioned to hospice 10/20, awaiting placement for hospice care. Currently on modified hospice with IV abx per discussion attending had with patient's family  IV dilaudid 1mg q6 scheduled, 1mg q3 prn             VTE Pharmacologic Prophylaxis: VTE Score: 4 Moderate Risk (Score 3-4) - Pharmacological DVT Prophylaxis Contraindicated. Sequential Compression Devices Ordered. Patient Centered Rounds: I performed bedside rounds with nursing staff today. Discussions with Specialists or Other Care Team Provider: attending, hospice liaison    Education and Discussions with Family / Patient: Updated  (wife) via phone. Total Time Spent on Date of Encounter in care of patient: 30 mins. This time was spent on one or more of the following: performing physical exam; counseling and coordination of care; obtaining or reviewing history; documenting in the medical record; reviewing/ordering tests, medications or procedures; communicating with other healthcare professionals and discussing with patient's family/caregivers. Current Length of Stay: 3 day(s)  Current Patient Status: Inpatient   Certification Statement: The patient will continue to require additional inpatient hospital stay due to placement  Discharge Plan: Anticipate discharge in 24-48 hrs to ipu? Code Status: Level 4 - Comfort Care    Subjective:   Patient was sleeping comfortably when I evaluated him, woke up to verbal stimulus. He is confused and not as conversant as prior days. Objective:     Vitals:   No data recorded. Body mass index is 24.82 kg/m². Input and Output Summary (last 24 hours):   No intake or output data in the 24 hours ending 10/21/23 1321    Physical Exam:   Physical Exam  Vitals and nursing note reviewed. Constitutional:       Appearance: He is well-developed. He is ill-appearing and toxic-appearing. Eyes:      General: Scleral icterus present. Cardiovascular:      Heart sounds: No murmur heard. Pulmonary:      Effort: Pulmonary effort is normal.   Abdominal:      General: There is distension. Palpations: Abdomen is soft. Tenderness: There is no abdominal tenderness. Musculoskeletal:         General: No swelling. Cervical back: Neck supple. Right lower leg: No edema. Left lower leg: No edema. Skin:     General: Skin is warm and dry. Capillary Refill: Capillary refill takes less than 2 seconds. Coloration: Skin is jaundiced. Psychiatric:         Mood and Affect: Mood normal.          Additional Data:     Labs:  Results from last 7 days   Lab Units 10/20/23  0448 10/19/23  0543 10/18/23  1821   WBC Thousand/uL 22.08*   < > 19.63*   HEMOGLOBIN g/dL 10.6*   < > 11.5*   HEMATOCRIT % 32.1*   < > 34.4*   PLATELETS Thousands/uL 183   < > 258   BANDS PCT % 1  --   --    NEUTROS PCT %  --   --  89*   LYMPHS PCT %  --   --  2*   LYMPHO PCT % 0*  --   --    MONOS PCT %  --   --  6   MONO PCT % 2*  --   --    EOS PCT % 1  --  0    < > = values in this interval not displayed.      Results from last 7 days   Lab Units 10/20/23  0448   SODIUM mmol/L 130*   POTASSIUM mmol/L 4.0   CHLORIDE mmol/L 97   CO2 mmol/L 21   BUN mg/dL 58*   CREATININE mg/dL 2.62*   ANION GAP mmol/L 12   CALCIUM mg/dL 7.9*   ALBUMIN g/dL 2.4*   TOTAL BILIRUBIN mg/dL 32.05*   ALK PHOS U/L 772*   ALT U/L 116*   AST U/L 268*   GLUCOSE RANDOM mg/dL 81     Results from last 7 days   Lab Units 10/18/23  1821   INR  1.27*     Results from last 7 days   Lab Units 10/17/23  1218   POC GLUCOSE mg/dl 104         Results from last 7 days   Lab Units 10/20/23  0448 10/18/23  2046 10/18/23  1821   LACTIC ACID mmol/L  --  1.7 2.3*   PROCALCITONIN ng/ml 4.56*  --  2.76*       Lines/Drains:  Invasive Devices       Peripheral Intravenous Line  Duration             Peripheral IV 10/18/23 Right Antecubital 2 days    Peripheral IV 10/18/23 Right;Ventral (anterior) Forearm 2 days                          Imaging: Reviewed radiology reports from this admission including: xray(s)    Recent Cultures (last 7 days):   Results from last 7 days   Lab Units 10/18/23  1845 10/18/23  1821   BLOOD CULTURE  No Growth at 48 hrs. No Growth at 48 hrs. Last 24 Hours Medication List:   Current Facility-Administered Medications   Medication Dose Route Frequency Provider Last Rate    acetaminophen  650 mg Oral Q6H PRN Didier Shaikh,       bisacodyl  10 mg Rectal Daily PRN Gian Hinkle, DO      cefepime  2,000 mg Intravenous Q12H Veronica Steward MD 2,000 mg (10/21/23 0740)    famotidine  20 mg Oral Daily Veronica Steward MD      haloperidol lactate  0.5 mg Intravenous Q2H PRN Gian Senate, DO      HYDROmorphone  0.5 mg Intravenous Q4H PRN Gian Senate, DO      HYDROmorphone  1 mg Intravenous Q3H PRN Gian Giraldoate, DO      HYDROmorphone  1 mg Intravenous Q6H Akanksha Balasundram, DO      LORazepam  0.5 mg Intravenous Q4H PRN Murali Herrera DO      metroNIDAZOLE  500 mg Intravenous Q8H Germain Bray  mg (10/21/23 0601)    oxyCODONE  5 mg Oral Q6H PRN Veronica Steward MD      sodium chloride (PF)  3 mL Intravenous Q12H [facility] Charisma Geiger MD          Today, Patient Was Seen By: Gian Hinkle DO    **Please Note: This note may have been constructed using a voice recognition system. **

## 2023-10-21 NOTE — ASSESSMENT & PLAN NOTE
Patient with pancreatic cancer, mets to liver s/p stents/ERCP on 10/16  Presented with acute pancreatitis, liver failure  Transitioned to hospice 10/20, awaiting placement for hospice care.      Currently on modified hospice with IV abx per discussion attending had with patient's family  IV dilaudid 1mg q6 scheduled, 1mg q3 prn

## 2023-10-22 NOTE — ASSESSMENT & PLAN NOTE
Recent Labs     10/20/23  0448   CREATININE 2.62*   EGFR 24       Estimated Creatinine Clearance: 31.3 mL/min (A) (by C-G formula based on SCr of 2.62 mg/dL (H)).     POA 1.63; (baseline 0.9)  Likely pre-renal in setting of dehydration/decreased PO intake  UA showed bilirubinuria, low clinical suspicion for UTI  Patient received 1L LR bolus in ED  Creatinine continues to uptrend likely in the setting of hepatorenal syndrome    Plan:  Hospice

## 2023-10-22 NOTE — ASSESSMENT & PLAN NOTE
Patient with pancreatic cancer, mets to liver s/p stents/ERCP on 10/16  Presented with acute pancreatitis, liver failure  Transitioned to hospice 10/20, awaiting placement for hospice care.      IV dilaudid 1mg q6 scheduled, 1mg q3 prn, 0.5mg q4 prn

## 2023-10-22 NOTE — ASSESSMENT & PLAN NOTE
- See PET scan results for diffuse metastatic disease  - Currently following with Dr. Nydia Lawson  - As of 10/20/2023 patient has decided to pursue hospice care  - Hospice and palliative consulted  - Pain control per palliative  -Comfort care orders placed

## 2023-10-22 NOTE — HOSPICE NOTE
Pt approved for [facility] LOC Hospice Services at [facility] by Dr Nikita De La Fuente. Liaison met with pt sister at pt bedside and spoke to pt wife, Mehran Rodriguezutant, via [place]. Explained hospice services and potential costs associated. Pt wife wishes to clarify costs with insurance in the AM for IPU vs  SNF. Updated ALBERTO Rodriguez and Dr Yudy Troy with plan. Liaison to follow up tomorrow.

## 2023-10-22 NOTE — DISCHARGE SUMMARY
[address] [facility]  Discharge- Magen Arriaga 1960, 58 y.o. male MRN: [id number]  Unit/Bed#: S -01 Encounter: [id number]  Primary Care Provider: Reginald Vasquez MD   Date and time admitted to hospital: 10/18/2023  5:33 PM    * Pancreatic cancer [facility])  Assessment & Plan  - See PET scan results for diffuse metastatic disease  - Followed with Dr. Pepe Marcano    - As of 10/20/2023 patient has decided to pursue hospice care  - Hospice and palliative consulted  - Pain control per palliative  -Comfort care orders placed    Acute pancreatitis  Assessment & Plan  Lab Results   Component Value Date    LIPASE 1,016 (H) 10/18/2023     No results for input(s): "WBC" in the last 72 hours. Patient underwent endoscopic stenting 10/16   Patient has been receiving prophylaxis with Levaquin   Received IV cefepime and 1L LR bolus in ED  Likely post-ERCP acute pancreatitis with biliary obstruction   Aleck Brim jaundice appreciated on exam    Plan:   Hospice care being pursued  Pain control per palliative    Hospice care patient  Assessment & Plan  Patient with pancreatic cancer, mets to liver s/p stents/ERCP on 10/16  Presented with acute pancreatitis, liver failure  Transitioned to hospice 10/20, awaiting placement for hospice care. IV dilaudid PRN    Sepsis ([address])  Assessment & Plan  - Completed 5 days abx for Biliary infection    QUINN (acute kidney injury) ([address])  Assessment & Plan  No results for input(s): "CREATININE", "EGFR" in the last 72 hours. Estimated Creatinine Clearance: 31.3 mL/min (A) (by C-G formula based on SCr of 2.62 mg/dL (H)).     POA 1.63; (baseline 0.9)  Likely pre-renal in setting of dehydration/decreased PO intake  UA showed bilirubinuria, low clinical suspicion for UTI  Patient received 1L LR bolus in ED  Creatinine continues to uptrend likely in the setting of hepatorenal syndrome    Plan:  Hospice care    HLD (hyperlipidemia)  Assessment & Plan  Medications discontinued    Hyponatremia  Assessment & Plan  No results for input(s): "SODIUM" in the last 72 hours. No results found for: "OSMOUA", "Prashanth Daughters", "OSMOLALITSER"    Euvolemic with abdominal distention  Likely secondary to decreased PO intake, nausea  With associated QUINN as above    Plan:  Hospice care      Medical Problems       Resolved Problems  Date Reviewed: 10/21/2023   None       Discharging Resident: Liss Ybarra MD  Discharging Attending: Rxoanne Mckeon MD  PCP: Reginald Vasquez MD  Admission Date:   Admission Orders (From admission, onward)       Ordered        10/18/23 [address]  Once                          Discharge Date: 10/24/23    Consultations During Hospital Stay:  GI    Procedures Performed:   None    Significant Findings / Test Results:   XR abdomen 1 view kub   Final Result by Author MD Jimenez (10/20 1150)      Nonobstructive bowel gas pattern. Small bilateral pleural effusions. Workstation performed: [id number]         CT abdomen pelvis with contrast   Final Result by Supriya James DO (10/18 6143)   Findings consistent with acute interstitial pancreatitis, presumably post ERCP-related. Mild to moderate abdominopelvic ascites, increased from prior. Pancreatic tail mass, similar to prior. Diffuse hepatic metastatic disease again seen. Focal area of hyperenhancement in the inferior right hepatic lobe measuring 0.8 x 0.8 cm, favored to to be postoperative in etiology status post ERCP cholangiogram, although a small area of active extravasation can have a similar appearance and cannot    strictly be excluded by imaging. This can be followed up with short-term follow-up contrast-enhanced CT to ensure resolution as clinically indicated. Abdominal lymphadenopathy presumably metastatic as detailed above, again seen.    Mild wall thickening of the ascending and proximal transverse colon at the hepatic flexure, which may be related to portal hypertensive colopathy. Findings discussed with Dr. Giles Edmondson at 10:31 PM on 10/18/2023                  Workstation performed: [id number]         XR chest portable   Final Result by Joan Mitchell MD (10/19 0825)         Small left pleural effusion. Patchy airspace opacity at the left lung base may resent atelectasis or pneumonitis. Workstation performed: [id number]               Incidental Findings:   None      Test Results Pending at Discharge (will require follow up): None     Outpatient Tests Requested:  None    Complications: None    Reason for Admission: Acute pancreatitis    Hospital Course:   Rachel Gillette is a 58 y.o. male patient who originally presented to the hospital on 10/18/2023 due to acute pancreatitis. Patient had a recent endoscopic stenting on 10/16. He presented with increasing abdominal pain and worsening jaundice. He clinically started to decline and also had increasing total bili and direct bili. His lipase was 1016 on arrival.  Additionally, he had an acute kidney injury with hyponatremia. Due to increasing cancer burden with acute pancreatitis and increasing pain, patient and family decided to pursue hospice care. Please see above list of diagnoses and related plan for additional information. Condition at Discharge:      Discharge Day Visit / Exam:   Subjective: Patient resting peacefully this morning.    Vitals: Blood Pressure: 113/64 (10/20/23 0756)  Pulse: 99 (10/22/23 2252)  Temperature: (!) 97.4 °F (36.3 °C) (10/20/23 0756)  Temp Source: Oral (10/20/23 0756)  Respirations: 18 (10/22/23 2252)  Height: 6' (182.9 cm) (10/19/23 0100)  SpO2: 93 % (10/22/23 0700)  Exam:   Physical Exam     INPATIENT DEATH NOTE  Rachel iGllette 58 y.o. male MRN: [id number]  Unit/Bed#: S -01 Encounter: [id number]    Date, Time and Cause of Death    Date of Death: 10/24/23  Time of Death:  8:35 AM  Preliminary Cause of Death: Pancreas cancer [facility])  Entered by: Booker Mays MD[VR1.1]       Attribution       VR1. Booker Mays MD 10/24/23 09:03                 PHYSICAL EXAM:  Unresponsive to noxious stimuli, Spontaneous respirations absent, Breath sounds absent, Carotid pulse absent, Heart sounds absent, and Pupillary light reflex absent    Medical Examiner notification criteria:  NONE APPLICABLE   Medical Examiner's office notified?:  No, does not meet ME notification criteria   Medical Examiner accepted case?:  No  Name of Medical Examiner: N/A         Autopsy Options:  Post-mortem examination declined by next of kin    Primary Service Attending Physician notified?:  yes - Attending:  Bradly Payne MD    Physician/Resident responsible for completing Discharge Summary:  Booker Mays MD          Attestation signed by Bradly Payne MD at 10/24/2023  1:16 PM:  Agree with documentation       Discussion with Family: Updated  (sister) at bedside. Called wife Allison Martell. Discharge instructions/Information to patient and family:   See after visit summary for information provided to patient and family. Provisions for Follow-Up Care:  See after visit summary for information related to follow-up care and any pertinent home health orders. Disposition:   Other: Hospice    Planned Readmission: No    Discharge Medications:  See after visit summary for reconciled discharge medications provided to patient and/or family.       **Please Note: This note may have been constructed using a voice recognition system**

## 2023-10-22 NOTE — ASSESSMENT & PLAN NOTE
Recent Labs     10/20/23  0448   SODIUM 130*       No results found for: "OSMOUA", "NAUR", "OSMOLALITSER"    Euvolemic with abdominal distention  Likely secondary to decreased PO intake, nausea  With associated QUINN as above    Plan:  Hospice

## 2023-10-22 NOTE — PROGRESS NOTES
[address] [facility]  Progress Note  Name: Madhu Roberto I  MRN: [id number]  Unit/Bed#: S -96 I Date of Admission: 10/18/2023   Date of Service: 10/22/2023 I Hospital Day: 4    Assessment/Plan   Pancreatic cancer [facility])  Assessment & Plan  - See PET scan results for diffuse metastatic disease  - Currently following with Dr. Nora Adams  - As of 10/20/2023 patient has decided to pursue hospice care  - Hospice and palliative consulted  - Pain control per palliative  -Comfort care orders placed    Acute pancreatitis  Assessment & Plan  Lab Results   Component Value Date    LIPASE 1,016 (H) 10/18/2023     Recent Labs     10/20/23  0448   WBC 22.08*       Patient underwent endoscopic stenting 10/16   Patient has been receiving prophylaxis with Levaquin   Received IV cefepime and 1L LR bolus in ED  Likely post-ERCP acute pancreatitis with biliary obstruction   Milfay Zahira jaundice appreciated on exam      Plan:   Hospice care being pursued  Pain control per palliative    Sepsis [facility])  Assessment & Plan  - Patient continued on Zosyn while on comfort care  -This will be discontinued as patient goes to inpatient unit    QUINN (acute kidney injury) [facility])  Assessment & Plan  Recent Labs     10/20/23  0448   CREATININE 2.62*   EGFR 24       Estimated Creatinine Clearance: 31.3 mL/min (A) (by C-G formula based on SCr of 2.62 mg/dL (H)).     POA 1.63; (baseline 0.9)  Likely pre-renal in setting of dehydration/decreased PO intake  UA showed bilirubinuria, low clinical suspicion for UTI  Patient received 1L LR bolus in ED  Creatinine continues to uptrend likely in the setting of hepatorenal syndrome    Plan:  Hospice    HLD (hyperlipidemia)  Assessment & Plan  Medications discontinued    Hyponatremia  Assessment & Plan  Recent Labs     10/20/23  0448   SODIUM 130*       No results found for: "OSMOUA", "Zondra Maclachlan", "OSMOLALITSER"    Euvolemic with abdominal distention  Likely secondary to decreased PO intake, nausea  With associated QUINN as above    Plan:  [facility] patient  Assessment & Plan  Patient with pancreatic cancer, mets to liver s/p stents/ERCP on 10/16  Presented with acute pancreatitis, liver failure  Transitioned to hospice 10/20, awaiting placement for hospice care. Currently on modified hospice with IV abx per discussion attending had with patient's family  IV dilaudid 1mg q6 scheduled, 1mg q3 prn             VTE Pharmacologic Prophylaxis: VTE Score: 4 Moderate Risk (Score 3-4) - Pharmacological DVT Prophylaxis Contraindicated. Sequential Compression Devices Ordered. Patient Centered Rounds: I performed bedside rounds with nursing staff today. Discussions with Specialists or Other Care Team Provider: attending, hospice liaison    Education and Discussions with Family / Patient: Updated  (sister) at bedside. Total Time Spent on Date of Encounter in care of patient: 30 mins. This time was spent on one or more of the following: performing physical exam; counseling and coordination of care; obtaining or reviewing history; documenting in the medical record; reviewing/ordering tests, medications or procedures; communicating with other healthcare professionals and discussing with patient's family/caregivers. Current Length of Stay: 4 day(s)  Current Patient Status: Inpatient   Certification Statement: The patient will continue to require additional inpatient hospital stay due to placement  Discharge Plan: Anticipate discharge in 24-48 hrs to ipu? Code Status: Level 4 - Comfort Care    Subjective:   Patient was sleeping comfortably when I evaluated him. Objective:     Vitals:   No data recorded. HR:  [88-92] 92  Resp:  [20] 20  SpO2:  [93 %] 93 %  Body mass index is 24.82 kg/m². Input and Output Summary (last 24 hours):   No intake or output data in the 24 hours ending 10/22/23 1059    Physical Exam:   Physical Exam  Vitals and nursing note reviewed.    Constitutional: Appearance: He is well-developed. He is ill-appearing and toxic-appearing. Eyes:      General: Scleral icterus present. Cardiovascular:      Comments: CV and pulm exam deferred  Pulmonary:      Effort: Pulmonary effort is normal.   Abdominal:      General: There is distension. Musculoskeletal:         General: No swelling. Right lower leg: No edema. Left lower leg: No edema. Skin:     General: Skin is warm and dry. Capillary Refill: Capillary refill takes less than 2 seconds. Coloration: Skin is jaundiced. Psychiatric:         Mood and Affect: Mood normal.          Additional Data:     Labs:  Results from last 7 days   Lab Units 10/20/23  0448 10/19/23  0543 10/18/23  1821   WBC Thousand/uL 22.08*   < > 19.63*   HEMOGLOBIN g/dL 10.6*   < > 11.5*   HEMATOCRIT % 32.1*   < > 34.4*   PLATELETS Thousands/uL 183   < > 258   BANDS PCT % 1  --   --    NEUTROS PCT %  --   --  89*   LYMPHS PCT %  --   --  2*   LYMPHO PCT % 0*  --   --    MONOS PCT %  --   --  6   MONO PCT % 2*  --   --    EOS PCT % 1  --  0    < > = values in this interval not displayed.      Results from last 7 days   Lab Units 10/20/23  0448   SODIUM mmol/L 130*   POTASSIUM mmol/L 4.0   CHLORIDE mmol/L 97   CO2 mmol/L 21   BUN mg/dL 58*   CREATININE mg/dL 2.62*   ANION GAP mmol/L 12   CALCIUM mg/dL 7.9*   ALBUMIN g/dL 2.4*   TOTAL BILIRUBIN mg/dL 32.05*   ALK PHOS U/L 772*   ALT U/L 116*   AST U/L 268*   GLUCOSE RANDOM mg/dL 81     Results from last 7 days   Lab Units 10/18/23  1821   INR  1.27*     Results from last 7 days   Lab Units 10/17/23  1218   POC GLUCOSE mg/dl 104         Results from last 7 days   Lab Units 10/20/23  0448 10/18/23  2046 10/18/23  1821   LACTIC ACID mmol/L  --  1.7 2.3*   PROCALCITONIN ng/ml 4.56*  --  2.76*       Lines/Drains:  Invasive Devices       Peripheral Intravenous Line  Duration             Peripheral IV 10/18/23 Right Antecubital 3 days    Peripheral IV 10/18/23 Right;Ventral (anterior) Forearm 3 days              Drain  Duration             Urethral Catheter Latex 16 Fr. <1 day                          Imaging: Reviewed radiology reports from this admission including: xray(s)    Recent Cultures (last 7 days):   Results from last 7 days   Lab Units 10/18/23  1845 10/18/23  1821   BLOOD CULTURE  No Growth at 72 hrs. No Growth at 72 hrs. Last 24 Hours Medication List:   Current Facility-Administered Medications   Medication Dose Route Frequency Provider Last Rate    acetaminophen  650 mg Oral Q6H PRN Shana Jj DO      bisacodyl  10 mg Rectal Daily PRN Deward Drain, DO      cefepime  2,000 mg Intravenous Q12H Snehal Blanco MD 2,000 mg (10/22/23 0222)    famotidine  20 mg Oral Daily Snehal Blanco MD      haloperidol lactate  0.5 mg Intravenous Q2H PRN Deward Drain, DO      HYDROmorphone  0.5 mg Intravenous Q4H PRN Deward Drain, DO      HYDROmorphone  1 mg Intravenous Q3H PRN Deward Drain, DO      HYDROmorphone  1 mg Intravenous Q6H Akanksha Foreman, DO      LORazepam  0.5 mg Intravenous Q4H PRN Terrance Barker DO      metroNIDAZOLE  500 mg Intravenous Q8H Adolph Sandhoff,  mg (10/22/23 9869)    oxyCODONE  5 mg Oral Q6H PRN Snehal Blanco MD      sodium chloride (PF)  3 mL Intravenous Q12H [address] Thomas Martinez MD          Today, Patient Was Seen By: Shana Jj DO    **Please Note: This note may have been constructed using a voice recognition system. **

## 2023-10-22 NOTE — ASSESSMENT & PLAN NOTE
Lab Results   Component Value Date    LIPASE 1,016 (H) 10/18/2023     No results for input(s): "WBC" in the last 72 hours.   Patient underwent endoscopic stenting 10/16   Patient has been receiving prophylaxis with Levaquin   Received IV cefepime and 1L LR bolus in ED  Likely post-ERCP acute pancreatitis with biliary obstruction   Blenda Bald jaundice appreciated on exam      Plan:   Hospice care being pursued  Pain control per palliative

## 2023-10-23 NOTE — HOSPICE NOTE
Spoke with pts wife Lacy Chau today. Reviewed hospice care and services per Medicare guidelines. Per finance they have a daily co pay of 390 dollars a day for inpatient hospice. Wife wants to call the insurance company to confirm their financial responsibility and will call me back with her decision.

## 2023-10-23 NOTE — PROGRESS NOTES
[address] [facility]  Progress Note  Name: Mine Basurto I  MRN: [id number]  Unit/Bed#: S -92 I Date of Admission: 10/18/2023   Date of Service: 10/23/2023 I Hospital Day: 5    Assessment/Plan   * Pancreatic cancer [facility])  Assessment & Plan  - See PET scan results for diffuse metastatic disease  - Currently following with Dr. Jes Euceda  - As of 10/20/2023 patient has decided to pursue hospice care  - Hospice and palliative consulted  - Pain control per palliative  -Comfort care orders placed    Hospice care patient  Assessment & Plan  Patient with pancreatic cancer, mets to liver s/p stents/ERCP on 10/16  Presented with acute pancreatitis, liver failure  Transitioned to hospice 10/20, awaiting placement for hospice care. IV dilaudid 1mg q6 scheduled, 1mg q3 prn, 0.5mg q4 prn    Sepsis ([address])  Assessment & Plan  - Completed 5 days abx for ? Biliary infection    QUINN (acute kidney injury) [facility])  Assessment & Plan  Recent Labs     10/20/23  0448   CREATININE 2.62*   EGFR 24       Estimated Creatinine Clearance: 31.3 mL/min (A) (by C-G formula based on SCr of 2.62 mg/dL (H)). POA 1.63; (baseline 0.9)  Likely pre-renal in setting of dehydration/decreased PO intake  UA showed bilirubinuria, low clinical suspicion for UTI  Patient received 1L LR bolus in ED  Creatinine continues to uptrend likely in the setting of hepatorenal syndrome    Plan:  Hospice    HLD (hyperlipidemia)  Assessment & Plan  Medications discontinued    Hyponatremia  Assessment & Plan  Recent Labs     10/20/23  0448   SODIUM 130*       No results found for: "OSMOUA", "Rilla Molina", "OSMOLALITSER"    Euvolemic with abdominal distention  Likely secondary to decreased PO intake, nausea  With associated QUINN as above    Plan:  Hospice    Acute pancreatitis  Assessment & Plan  Lab Results   Component Value Date    LIPASE 1,016 (H) 10/18/2023     No results for input(s): "WBC" in the last 72 hours.   Patient underwent endoscopic stenting 10/16 Patient has been receiving prophylaxis with Levaquin   Received IV cefepime and 1L LR bolus in ED  Likely post-ERCP acute pancreatitis with biliary obstruction   David Fan jaundice appreciated on exam      Plan:   Hospice care being pursued  Pain control per palliative           VTE Pharmacologic Prophylaxis: VTE Score: 4  Hospice    Patient Centered Rounds: I performed bedside rounds with nursing staff today. Discussions with Specialists or Other Care Team Provider: None    Education and Discussions with Family / Patient: Updated  (wife) via phone. Current Length of Stay: 5 day(s)  Current Patient Status: Inpatient   Discharge Plan: Anticipate discharge in 24-48 hrs to inpatient hospice    Code Status: Level 4 - Comfort Care    Subjective:   No acute overnight events. He was restless in the morning and received Dilaudid and Ativan to calm him down. Objective:     Vitals:   No data recorded. HR:  [90-99] 99  Resp:  [18-20] 18  Body mass index is 24.82 kg/m². Input and Output Summary (last 24 hours):   No intake or output data in the 24 hours ending 10/23/23 1426    Physical Exam:   Physical Exam  Vitals and nursing note reviewed. Constitutional:       Appearance: He is well-developed. He is ill-appearing and toxic-appearing. Eyes:      General: Scleral icterus present. Cardiovascular:      Rate and Rhythm: Normal rate and regular rhythm. Pulses: Normal pulses. Heart sounds: Normal heart sounds. No murmur heard. No friction rub. No gallop. Pulmonary:      Effort: Pulmonary effort is normal.      Breath sounds: No wheezing, rhonchi or rales. Abdominal:      General: There is distension. Musculoskeletal:         General: Swelling present. Right lower leg: Edema present. Left lower leg: Edema present. Skin:     General: Skin is warm and dry. Capillary Refill: Capillary refill takes less than 2 seconds. Coloration: Skin is jaundiced. Psychiatric:         Mood and Affect: Mood normal.          Additional Data:     Labs:  Results from last 7 days   Lab Units 10/20/23  0448 10/19/23  0543 10/18/23  1821   WBC Thousand/uL 22.08*   < > 19.63*   HEMOGLOBIN g/dL 10.6*   < > 11.5*   HEMATOCRIT % 32.1*   < > 34.4*   PLATELETS Thousands/uL 183   < > 258   BANDS PCT % 1  --   --    NEUTROS PCT %  --   --  89*   LYMPHS PCT %  --   --  2*   LYMPHO PCT % 0*  --   --    MONOS PCT %  --   --  6   MONO PCT % 2*  --   --    EOS PCT % 1  --  0    < > = values in this interval not displayed. Results from last 7 days   Lab Units 10/20/23  0448   SODIUM mmol/L 130*   POTASSIUM mmol/L 4.0   CHLORIDE mmol/L 97   CO2 mmol/L 21   BUN mg/dL 58*   CREATININE mg/dL 2.62*   ANION GAP mmol/L 12   CALCIUM mg/dL 7.9*   ALBUMIN g/dL 2.4*   TOTAL BILIRUBIN mg/dL 32.05*   ALK PHOS U/L 772*   ALT U/L 116*   AST U/L 268*   GLUCOSE RANDOM mg/dL 81     Results from last 7 days   Lab Units 10/18/23  1821   INR  1.27*     Results from last 7 days   Lab Units 10/17/23  1218   POC GLUCOSE mg/dl 104         Results from last 7 days   Lab Units 10/20/23  0448 10/18/23  2046 10/18/23  1821   LACTIC ACID mmol/L  --  1.7 2.3*   PROCALCITONIN ng/ml 4.56*  --  2.76*       Lines/Drains:  Invasive Devices       Peripheral Intravenous Line  Duration             Peripheral IV 10/18/23 Right Antecubital 4 days    Peripheral IV 10/18/23 Right;Ventral (anterior) Forearm 4 days              Drain  Duration             Urethral Catheter Latex 16 Fr. 1 day                  Urinary Catheter:  Goal for removal: N/A - [facility]               Imaging: No pertinent imaging reviewed. Recent Cultures (last 7 days):   Results from last 7 days   Lab Units 10/18/23  1845 10/18/23  1821   BLOOD CULTURE  No Growth After 4 Days. No Growth After 4 Days.        Last 24 Hours Medication List:   Current Facility-Administered Medications   Medication Dose Route Frequency Provider Last Rate    acetaminophen  650 mg Oral Q6H PRN Debbie Canter, DO      bisacodyl  10 mg Rectal Daily PRN Lum Shook, DO      famotidine  20 mg Oral Daily Halford Leventhal, MD      haloperidol lactate  0.5 mg Intravenous Q2H PRN Lum Shook, DO      HYDROmorphone  0.5 mg Intravenous Q4H PRN Lum Shook, DO      HYDROmorphone  1 mg Intravenous Q3H PRN Lum Shook, DO      HYDROmorphone  1 mg Intravenous Q6H Akanksha Balasundram, DO      LORazepam  0.5 mg Intravenous Q4H PRN Paragonahmaria de jesus PérezWyatt, DO      oxyCODONE  5 mg Oral Q6H PRN Halford Leventhal, MD      sodium chloride (PF)  3 mL Intravenous Q12H [address] Marquis Nickerson MD          Today, Patient Was Seen By: Mustapha Rodriguez MD    **Please Note: This note may have been constructed using a voice recognition system. **

## 2023-10-24 NOTE — DEATH NOTE
INPATIENT DEATH NOTE  Khang Garcia 58 y.o. male MRN: [id number]  Unit/Bed#: S -94 Encounter: [id number]    Date, Time and Cause of Death    Date of Death: 10/24/23  Time of Death:  8:35 AM  Preliminary Cause of Death: Pancreas cancer ([address] )  Entered by: Meg Del Valle MD[VR1.1]       Attribution       VR1. Meg Del Valle MD 10/24/23 09:03                 PHYSICAL EXAM:  Unresponsive to noxious stimuli, Spontaneous respirations absent, Breath sounds absent, Carotid pulse absent, Heart sounds absent, and Pupillary light reflex absent    Medical Examiner notification criteria:  NONE APPLICABLE   Medical Examiner's office notified?:  No, does not meet ME notification criteria   Medical Examiner accepted case?:  No  Name of Medical Examiner: N/A         Autopsy Options:  Post-mortem examination declined by next of kin    Primary Service Attending Physician notified?:  yes - Attending:  Karel Lynne MD    Physician/Resident responsible for completing Discharge Summary:  Meg Del Valle MD

## 2023-10-24 NOTE — ASSESSMENT & PLAN NOTE
- See PET scan results for diffuse metastatic disease  - Followed with Dr. Irma Olea    - As of 10/20/2023 patient has decided to pursue hospice care  - Hospice and palliative consulted  - Pain control per palliative  -Comfort care orders placed

## 2023-10-24 NOTE — ASSESSMENT & PLAN NOTE
Lab Results   Component Value Date    LIPASE 1,016 (H) 10/18/2023     No results for input(s): "WBC" in the last 72 hours.   Patient underwent endoscopic stenting 10/16   Patient has been receiving prophylaxis with Levaquin   Received IV cefepime and 1L LR bolus in ED  Likely post-ERCP acute pancreatitis with biliary obstruction   Massiel Kim jaundice appreciated on exam    Plan:   Hospice care being pursued  Pain control per palliative

## 2023-10-24 NOTE — ASSESSMENT & PLAN NOTE
Patient with pancreatic cancer, mets to liver s/p stents/ERCP on 10/16  Presented with acute pancreatitis, liver failure  Transitioned to hospice 10/20, awaiting placement for hospice care.      IV dilaudid PRN

## 2023-10-24 NOTE — ASSESSMENT & PLAN NOTE
No results for input(s): "SODIUM" in the last 72 hours.     No results found for: "OSMOUA", "Jhonathan Anselmo", "OSMOLALITSER"    Euvolemic with abdominal distention  Likely secondary to decreased PO intake, nausea  With associated QUINN as above    Plan:  Hospice care

## 2023-10-24 NOTE — ASSESSMENT & PLAN NOTE
No results for input(s): "CREATININE", "EGFR" in the last 72 hours. Estimated Creatinine Clearance: 31.3 mL/min (A) (by C-G formula based on SCr of 2.62 mg/dL (H)).     POA 1.63; (baseline 0.9)  Likely pre-renal in setting of dehydration/decreased PO intake  UA showed bilirubinuria, low clinical suspicion for UTI  Patient received 1L LR bolus in ED  Creatinine continues to uptrend likely in the setting of hepatorenal syndrome    Plan:  Hospice care